# Patient Record
Sex: MALE | Race: WHITE | Employment: FULL TIME | URBAN - METROPOLITAN AREA
[De-identification: names, ages, dates, MRNs, and addresses within clinical notes are randomized per-mention and may not be internally consistent; named-entity substitution may affect disease eponyms.]

---

## 2017-09-05 ENCOUNTER — APPOINTMENT (OUTPATIENT)
Dept: GENERAL RADIOLOGY | Age: 32
End: 2017-09-05
Attending: EMERGENCY MEDICINE
Payer: COMMERCIAL

## 2017-09-05 ENCOUNTER — HOSPITAL ENCOUNTER (EMERGENCY)
Age: 32
Discharge: HOME | End: 2017-09-05
Attending: EMERGENCY MEDICINE | Admitting: EMERGENCY MEDICINE
Payer: COMMERCIAL

## 2017-09-05 VITALS
RESPIRATION RATE: 16 BRPM | SYSTOLIC BLOOD PRESSURE: 149 MMHG | HEART RATE: 89 BPM | DIASTOLIC BLOOD PRESSURE: 79 MMHG | TEMPERATURE: 98.3 F | OXYGEN SATURATION: 100 %

## 2017-09-05 DIAGNOSIS — S43.402A SPRAIN OF LEFT SHOULDER, UNSPECIFIED SHOULDER SPRAIN TYPE, INITIAL ENCOUNTER: ICD-10-CM
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
## 2017-09-05 DIAGNOSIS — R03.0 ELEVATED BLOOD PRESSURE READING: ICD-10-CM

## 2017-09-05 DIAGNOSIS — S30.1XXA CONTUSION OF ABDOMINAL WALL, INITIAL ENCOUNTER: ICD-10-CM

## 2017-09-05 LAB
APPEARANCE UR: CLEAR
BILIRUB UR QL: NEGATIVE
COLOR UR: YELLOW
GLUCOSE UR STRIP.AUTO-MCNC: NEGATIVE MG/DL
HGB UR QL STRIP: NEGATIVE
KETONES UR QL STRIP.AUTO: NEGATIVE MG/DL
LEUKOCYTE ESTERASE UR QL STRIP.AUTO: NEGATIVE
NITRITE UR QL STRIP.AUTO: NEGATIVE
PH UR STRIP: 6 [PH] (ref 5–8)
PROT UR STRIP-MCNC: NEGATIVE MG/DL
SP GR UR REFRACTOMETRY: 1.01 (ref 1–1.03)
UROBILINOGEN UR QL STRIP.AUTO: 0.2 EU/DL (ref 0.2–1)

## 2017-09-05 PROCEDURE — 73030 X-RAY EXAM OF SHOULDER: CPT

## 2017-09-05 PROCEDURE — 99282 EMERGENCY DEPT VISIT SF MDM: CPT

## 2017-09-05 PROCEDURE — 81003 URINALYSIS AUTO W/O SCOPE: CPT | Performed by: EMERGENCY MEDICINE

## 2017-09-05 PROCEDURE — 73000 X-RAY EXAM OF COLLAR BONE: CPT

## 2017-09-05 RX ORDER — CYCLOBENZAPRINE HCL 10 MG
10 TABLET ORAL
Qty: 10 TAB | Refills: 0 | Status: SHIPPED | OUTPATIENT
Start: 2017-09-05 | End: 2020-01-21 | Stop reason: SDUPTHER

## 2017-09-05 RX ORDER — BUTALBITAL, ACETAMINOPHEN AND CAFFEINE 50; 325; 40 MG/1; MG/1; MG/1
1 TABLET ORAL
Status: DISCONTINUED | OUTPATIENT
Start: 2017-09-05 | End: 2017-09-05 | Stop reason: HOSPADM

## 2017-09-05 RX ORDER — IBUPROFEN 600 MG/1
600 TABLET ORAL
Status: DISCONTINUED | OUTPATIENT
Start: 2017-09-05 | End: 2017-09-05 | Stop reason: HOSPADM
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
## 2020-01-21 ENCOUNTER — HOSPITAL ENCOUNTER (OUTPATIENT)
Dept: OCCUPATIONAL MEDICINE | Age: 35
Discharge: HOME | End: 2020-01-21
Attending: FAMILY MEDICINE

## 2020-01-21 ENCOUNTER — OFFICE VISIT (OUTPATIENT)
Dept: ORTHOPEDIC SURGERY | Age: 35
End: 2020-01-21

## 2020-01-21 VITALS
HEIGHT: 74 IN | DIASTOLIC BLOOD PRESSURE: 96 MMHG | WEIGHT: 239 LBS | SYSTOLIC BLOOD PRESSURE: 132 MMHG | TEMPERATURE: 97.3 F | BODY MASS INDEX: 30.67 KG/M2 | RESPIRATION RATE: 15 BRPM | HEART RATE: 62 BPM

## 2020-01-21 DIAGNOSIS — S39.012A LUMBOSACRAL STRAIN, INITIAL ENCOUNTER: ICD-10-CM

## 2020-01-21 DIAGNOSIS — S39.012A LUMBOSACRAL STRAIN, INITIAL ENCOUNTER: Primary | ICD-10-CM

## 2020-01-21 DIAGNOSIS — M99.05 PELVIC SOMATIC DYSFUNCTION: ICD-10-CM

## 2020-01-21 DIAGNOSIS — M99.03 LUMBAR REGION SOMATIC DYSFUNCTION: ICD-10-CM

## 2020-01-21 DIAGNOSIS — M99.04 SACRAL REGION SOMATIC DYSFUNCTION: ICD-10-CM

## 2020-01-21 RX ORDER — CYCLOBENZAPRINE HCL 10 MG
10 TABLET ORAL
Qty: 10 TAB | Refills: 0 | Status: SHIPPED | OUTPATIENT
Start: 2020-01-21 | End: 2020-09-23

## 2020-01-21 RX ORDER — DIAZEPAM 2 MG/1
5 TABLET ORAL
Qty: 30 TAB | Refills: 0 | Status: SHIPPED | OUTPATIENT
Start: 2020-01-21 | End: 2020-09-23

## 2020-01-21 RX ORDER — PREDNISONE 20 MG/1
TABLET ORAL
Qty: 28 TAB | Refills: 0 | Status: SHIPPED | OUTPATIENT
Start: 2020-01-21 | End: 2020-03-05 | Stop reason: ALTCHOICE

## 2020-01-21 RX ORDER — HYDROCODONE BITARTRATE AND ACETAMINOPHEN 5; 325 MG/1; MG/1
1 TABLET ORAL
Qty: 21 TAB | Refills: 0 | Status: SHIPPED | OUTPATIENT
Start: 2020-01-21 | End: 2020-01-28

## 2020-01-21 NOTE — PATIENT INSTRUCTIONS
Follow-up on referral to physical therapy, perform stretches 2 - 5 times daily, use medication as prescribed, and return to the office in about 6 weeks. Search YouTube for my channel: 
 
Dr. Bello Ip Low back/Piriformis

## 2020-01-21 NOTE — PROGRESS NOTES
HISTORY OF PRESENT ILLNESS    John Martinez is a 27y.o. year old male comes in today as new patient for: low back pain    Patients symptoms have been present for about a month. Pain level 7/10 lower. It is unchanged with more activity which has helped in prior episodes. Patient has tried:  TENS unit which helps and ice without benefit. Using ibuprofen which helps a lot but stomach issues. It is described as pain and stiff w/o known injury and started after waking. 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 IMAGING: XR lumbar today narrowing L5S1 disc and straightening lumbar lordosis per my review - discussed w/ Pt. Past Surgical History:   Procedure Laterality Date    HX ACL RECONSTRUCTION      20 yrs ago cant remember what side per pt statement      Social History     Socioeconomic History    Marital status: SINGLE     Spouse name: Not on file    Number of children: Not on file    Years of education: Not on file    Highest education level: Not on file   Tobacco Use    Smoking status: Never Smoker    Smokeless tobacco: Never Used   Substance and Sexual Activity    Alcohol use: Yes     Alcohol/week: 1.0 standard drinks     Types: 1 Cans of beer per week     Comment: occasionally    Drug use: No   Social History Narrative    ** Merged History Encounter **           Current Outpatient Medications   Medication Sig Dispense Refill    XOLAIR 150 mg solr       omeprazole (PRILOSEC) 20 mg capsule TAKE 1 CAPSULE TWICE A DAY---30 MINS BEFOER BREAKFAST AD N DINNER ORALLY 90 DAYS  3    cyclobenzaprine (FLEXERIL) 10 mg tablet Take 1 Tab by mouth three (3) times daily as needed for Muscle Spasm(s). 10 Tab 0    omalizumab (XOLAIR) 150 mg solr 300 mg.  butalbital-acetaminophen-caff (FIORICET) -40 mg per capsule TAKE 1 TAB BY MOUTH 3 TIMES DAILY AS NEEDED.   3    rosuvastatin (CRESTOR) 10 mg tablet TAKE 1 TAB BY MOUTH EVERY NIGHT AT BEDTIME.  0     Past Medical History:   Diagnosis Date    Decreased urine stream     Dyslipidemia     Erythema multiforme     Heartburn     High cholesterol     Nocturia     Urinary frequency     Urinary urgency     Weak urinary stream      Family History   Problem Relation Age of Onset    No Known Problems Mother     No Known Problems Father     Other Brother        ROS:  No numb, tingle, swell, bruise, incont, fever. All other systems reviewed and negative aside from that written in the HPI. Objective:  BP (!) 132/96   Pulse 62   Temp 97.3 °F (36.3 °C)   Resp 15   Ht 6' 2\" (1.88 m)   Wt 239 lb (108.4 kg)   BMI 30.69 kg/m²   GEN:  Appears stated age in NAD. NEURO:  Sensation intact to light touch. Reflexes +2/4 patellar and Achilles bilaterally. M/S:  Examined seated and supine. Slump negative. Standing flexion test positive left  Stork positive left. ASIS low left  Iliac crests equal bilaterally Pubes equal bilaterally Medial malleolus low left  Sacral base posterior left  TRISH low left  Sphinx test Positive left TTA at L4, 5 on right worse flexion Rib(s) no TTP and equal LE Strength +5/5 bilaterally Piriformis normal bilaterally  EXT:  no clubbing/cyanosis. no edema. SKIN: Warm & dry w/o rash. HEENT: Conjunctiva/lids WNL. External canals/nares WNL. Tongue midline. PERRL, EOMI. Hearing intact. NECK: Trachea midline. Supple, Full ROM. No thyromegaly. CARDIAC: No edema. LUNGS: Normal effort. ABD: Soft, no masses. No HSM. PSYCH: A+O x3. Appropriate judgment and insight. Assessment/Plan:     ICD-10-CM ICD-9-CM    1. Lumbosacral strain, initial encounter S39.012A 846.0 XR SPINE LUMB 2 OR 3 V      predniSONE (DELTASONE) 20 mg tablet      cyclobenzaprine (FLEXERIL) 10 mg tablet      REFERRAL TO PHYSICAL THERAPY      diazePAM (VALIUM) 2 mg tablet      HYDROcodone-acetaminophen (NORCO) 5-325 mg per tablet   2.  Lumbar region somatic dysfunction M99.03 739.3 RI OSTEOPATHIC MANIP,3-4 BODY REGN   3. Sacral region somatic dysfunction M99.04 739.4 RI OSTEOPATHIC MANIP,3-4 BODY REGN   4. Pelvic somatic dysfunction M99.05 739.5 RI OSTEOPATHIC MANIP,3-4 BODY REGN     Verbal consent obtained. Lumbar, Pelvic and Sacral SD treated with ME. Correction of previous malalignments verified after Tx. Pt tolerated well. Notes improvement of Sx and pain is now rated 6-7/10 but stiffened significantly within 10 minutes. HEP/stretches daily. Discussed stretching/strengthening/posture. Patient (or guardian if minor) verbalizes understanding of evaluation and plan. Will start PT and use prednisone and flexeril for spasm at bed and demo HEP plan start PT when returns from business 2 weeks and RTC 6 weeks. Norco and valium for severe. Discussed periodic stretch during flight to [facility] and notify if issues.

## 2020-01-21 NOTE — PROGRESS NOTES
AVS reviewed: YES  HEP: AT demonstrated  Resources Provided: YES YouTube Videos  Patient questions/concerns answered: YES. Pt experienced increase pain while walking to x-ray. Pt's concerns were addressed by JMM & Pt encouraged to stretch.   Patient verbalized understanding of treatment plan: YES

## 2020-02-04 ENCOUNTER — OFFICE VISIT (OUTPATIENT)
Dept: ORTHOPEDIC SURGERY | Age: 35
End: 2020-02-04

## 2020-02-04 VITALS
SYSTOLIC BLOOD PRESSURE: 136 MMHG | TEMPERATURE: 97.1 F | RESPIRATION RATE: 15 BRPM | BODY MASS INDEX: 31.44 KG/M2 | HEART RATE: 81 BPM | HEIGHT: 74 IN | DIASTOLIC BLOOD PRESSURE: 94 MMHG | WEIGHT: 245 LBS

## 2020-02-04 DIAGNOSIS — M99.04 SACRAL REGION SOMATIC DYSFUNCTION: ICD-10-CM

## 2020-02-04 DIAGNOSIS — S39.012D LUMBOSACRAL STRAIN, SUBSEQUENT ENCOUNTER: Primary | ICD-10-CM

## 2020-02-04 DIAGNOSIS — M99.03 LUMBAR REGION SOMATIC DYSFUNCTION: ICD-10-CM

## 2020-02-04 DIAGNOSIS — M99.05 PELVIC SOMATIC DYSFUNCTION: ICD-10-CM

## 2020-02-04 RX ORDER — MELOXICAM 15 MG/1
TABLET ORAL
Qty: 90 TAB | Refills: 0 | Status: SHIPPED | OUTPATIENT
Start: 2020-02-04 | End: 2020-09-23 | Stop reason: ALTCHOICE

## 2020-02-04 NOTE — PATIENT INSTRUCTIONS
Follow-up with physical therapy to get scheduled, perform stretches daily, use medication as prescribed, and return to the office in about 4 weeks. Search YouTube for my channel: 
 
Dr. Adalberto Joyner Low back/[place]aira

## 2020-02-04 NOTE — PROGRESS NOTES
HISTORY OF PRESENT ILLNESS    Teri Dunlap is a 27y.o. year old male comes in today to be evaluated and treated for: back pain    Since last appt has noticed great improvement after manipulation the next day but traveled to [facility] foir work and 2 days later felt pop and pain returned. Has not yet schedule PT. Pain level 7/10. Using TENS, ice, prednisone taper, norco, felexeril, valium with benefit. IMAGING: XR lumbar 1/21/2020  IMPRESSION: Grade 1 retrolisthesis L5 with potential L5 spondylolysis. DDD at L5-S1. Past Surgical History:   Procedure Laterality Date    HX ACL RECONSTRUCTION      20 yrs ago cant remember what side per pt statement      Social History     Socioeconomic History    Marital status: SINGLE     Spouse name: Not on file    Number of children: Not on file    Years of education: Not on file    Highest education level: Not on file   Tobacco Use    Smoking status: Never Smoker    Smokeless tobacco: Never Used   Substance and Sexual Activity    Alcohol use: Yes     Alcohol/week: 1.0 standard drinks     Types: 1 Cans of beer per week     Comment: occasionally    Drug use: No   Social History Narrative    ** Merged History Encounter **          Current Outpatient Medications   Medication Sig Dispense Refill    omalizumab (XOLAIR) 150 mg solr 300 mg.  predniSONE (DELTASONE) 20 mg tablet Take 2 tabs in AM with food for 7 days then 1 tab in AM with food until gone 28 Tab 0    cyclobenzaprine (FLEXERIL) 10 mg tablet Take 1 Tab by mouth three (3) times daily as needed for Muscle Spasm(s). 10 Tab 0    diazePAM (VALIUM) 2 mg tablet Take 2.5 Tabs by mouth every eight (8) hours as needed (spasm). Max Daily Amount: 15 mg. 30 Tab 0    butalbital-acetaminophen-caff (FIORICET) -40 mg per capsule TAKE 1 TAB BY MOUTH 3 TIMES DAILY AS NEEDED.   3    XOLAIR 150 mg solr       omeprazole (PRILOSEC) 20 mg capsule TAKE 1 CAPSULE TWICE A DAY---30 MINS BEFOER BREAKFAST AD N DINNER ORALLY 90 DAYS  3  rosuvastatin (CRESTOR) 10 mg tablet TAKE 1 TAB BY MOUTH EVERY NIGHT AT BEDTIME.  0     Past Medical History:   Diagnosis Date    Decreased urine stream     Dyslipidemia     Erythema multiforme     Heartburn     High cholesterol     Nocturia     Urinary frequency     Urinary urgency     Weak urinary stream      Family History   Problem Relation Age of Onset    No Known Problems Mother     No Known Problems Father     Other Brother      ROS:  No numb, tingle, swell, bruise, incont, fever. Objective:  BP (!) 136/94   Pulse 81   Temp 97.1 °F (36.2 °C)   Resp 15   Ht 6' 2\" (1.88 m)   Wt 245 lb (111.1 kg)   BMI 31.46 kg/m²   GEN:  Appears stated age in NAD. NEURO:  Sensation intact to light touch. Reflexes +2/4 patellar and Achilles bilaterally. M/S:  Examined seated and supine. Slump negative. Standing flexion test positive left  Stork positive left. ASIS low left  Iliac crests equal bilaterally Pubes equal bilaterally Medial malleolus low left  Sacral base posterior left  TRISH low left  Sphinx test Positive left TTA at L3, 4, 5 on right worse flexion Rib(s) no TTP and equal LE Strength +5/5 bilaterally Piriformis normal bilaterally  EXT:  no clubbing/cyanosis. no edema. SKIN: Warm & dry w/o rash. Assessment/Plan:     ICD-10-CM ICD-9-CM    1. Lumbosacral strain, subsequent encounter S39.012D V58.89 meloxicam (MOBIC) 15 mg tablet     846.0    2. Pelvic somatic dysfunction M99.05 739.5 TX OSTEOPATHIC MANIP,3-4 BODY REGN   3. Sacral region somatic dysfunction M99.04 739.4 TX OSTEOPATHIC MANIP,3-4 BODY REGN   4. Lumbar region somatic dysfunction M99.03 739.3 TX OSTEOPATHIC MANIP,3-4 BODY REGN       Patient (or guardian if minor) verbalizes understanding of evaluation and plan. Verbal consent obtained. Lumbar, Pelvic and Sacral SD treated with ME. Correction of previous malalignments verified after Tx. Pt tolerated well.   Notes improvement of Sx and pain is now rated 1-2/10. HEP/stretches daily. Discussed stretching and will start PT and finish prednisone taper w/ mobic PRN and RTC 4 weeks.

## 2020-02-13 ENCOUNTER — APPOINTMENT (OUTPATIENT)
Dept: PHYSICAL THERAPY | Age: 35
End: 2020-02-13

## 2020-02-19 ENCOUNTER — HOSPITAL ENCOUNTER (OUTPATIENT)
Dept: PHYSICAL THERAPY | Age: 35
Discharge: HOME | End: 2020-02-19
Payer: COMMERCIAL

## 2020-02-19 PROCEDURE — 97162 PT EVAL MOD COMPLEX 30 MIN: CPT

## 2020-02-19 PROCEDURE — 97110 THERAPEUTIC EXERCISES: CPT

## 2020-02-19 NOTE — PROGRESS NOTES
[address] [place] [address] PHYSICAL THERAPY AT [facility] [address], [address] [facility] Elsy Wilk[address] - Phone: [phone]  Fax: [phone] / 0594 [facility]  Patient Name: Real Ray : 1985   Medical   Diagnosis: Back pain [M54.9]  Strain of muscle, fascia and tendon of lower back, initial encounter [X53.343L] Treatment Diagnosis: Back pain [M54.9]   Onset Date: 2019     Referral Source: Roberto Saeed [facility]): 2020   Prior Hospitalization: See medical history Provider #: [id number]   Prior Level of Function: WNL without limitations, intermittent \"throwing his back out\" that would resolve within 1 week   Comorbidities: Asthma   Medications: Verified on Patient Summary List   The Plan of Care and following information is based on the information from the initial evaluation.   ==================================================================================  Assessment / key information: Patient is 27 y.o. yo male who presents to [facility] with diagnosis of Back pain [M54.9]  Strain of muscle, fascia and tendon of lower back, initial encounter [D26.990E]. Pt reports onset LBP 2019. Pt has previous history of LB pain and spasms, which usually resolve in a week. Having an adjustment helps so he has gone to a chiropractor, which usually helps with his symptoms. Objective data detailed below. Patient scored 48 on FOTO indicating decreased function and quality of life. Functional limitations include difficulty with first thing in the morning, bending over, dragging luggage through airport, walking >1 block, sit-stand transfers from lower surfaces, stairs negotiation, standing. Pt would benefit from skilled PT services to address impairments, work towards goals, and return to OF.     Pain: current 0/10, at worst 8/10, at best 0/10  Aggravating factors: first thing in the morning, bending over, dragging luggage through airport, walking >1 block, sit-stand transfers from lower surfaces, stairs negotiation, standing  Alleviating factors: sitting, Ibuprofen  Pain description: intense dull into very sharp  Pain location: center or low back into lateral low back  Radiation? Numbness/tingling?  Not that pt can recall    Lumbar AROM/pain: flex mid shin with discomfort return to neutral, ext guarded without pain, SB R to lat R knee no pain L to lat L knee no pain, rot WNL bilat no pain  Hip MMT: flex R 4+ L 4+, ext R 3 L 3, ABD R 4 L 4, ADD R 3- L 3-, IR R 4+ L 4+, ER R 4+ L 4+  Knee/Ankle MMT: grossly WNL bilat  Upper abdom MMT: 4 (able to perform sit up with arms across chest)  Lower abdom MMT: 3+ (able to lower about 30 degs from vertical keeping lower abdom activated)  Sit-to-stand: dec forw flex phase  Ambulation: dec lumbar rotation  Stairs negotiation: unremarkable  Palpation: TTP and inc tone bilat lumbar paraspinals, bilat PSIS    Treatment performed: MHP to low back s/p session in supine with BLE elevated x10 mins  Patient response to treatment: Pt reports slight inc in low back tension at end of session, attributes it to AROM/MMT assessment.  ==================================================================================  Eval Complexity: History: MEDIUM  Complexity : 1-2 comorbidities / personal factors will impact the outcome/ POC Exam:MEDIUM Complexity : 3 Standardized tests and measures addressing body structure, function, activity limitation and / or participation in recreation  Presentation: MEDIUM Complexity : Evolving with changing characteristics  Clinical Decision Making:MEDIUM Complexity : FOTO score of 26-74Overall Complexity:MEDIUM  Problem List: pain affecting function, decrease ROM, decrease strength, impaired gait/ balance, decrease ADL/ functional abilitiies, decrease activity tolerance, decrease flexibility/ joint mobility and decrease transfer abilities   Treatment Plan may include any combination of the following: Therapeutic exercise, Therapeutic activities, Neuromuscular re-education, Physical agent/modality, Gait/balance training, Manual therapy, Patient education, Self Care training, Functional mobility training and Stair training  Patient / Family readiness to learn indicated by: asking questions, trying to perform skills and interest  Persons(s) to be included in education: patient (P)  Barriers to Learning/Limitations: None  Measures taken:    Patient Goal (s): \"To fix my back\"   Patient self reported health status: fair  Rehabilitation Potential: good   Short Term Goals: To be accomplished in 4 weeks:  1) Patient performing daily HEP. 2) Patient will report 50% improvement in ability to perform LE functional tasks, ADL, work duties. 3) Patient to demo lumbar AROM FF to toes without pain to facilitate LE functional tasks, ADL, work duties.  Long Term Goals: To be accomplished in 6 weeks:  1) Patient Independent with progressive HEP. 2) Patient to demo hip ABD, ER/IR, extension MMT 5/5 bilat to facilitate LE functional tasks, ADL, work duties. 3) Patient to demo lower abdominal MMT 5/5 (performs sit up with hands behind head) to facilitate LE functional tasks, ADL, work duties. 4) Increase FOTO score to 71 indicating improved function and quality of life. Frequency / Duration:   Patient to be seen 2-3 times per week for 6 weeks:  Patient / Caregiver education and instruction: exercises    Therapist Signature: Moody Maria PT Date: 1/93/7581   Certification Period: na Time: 10:51 AM   ==================================================================================  I certify that the above Physical Therapy Services are being furnished while the patient is under my care. I agree with the treatment plan and certify that this therapy is necessary.     Physician Signature:        Date:       Time:     Please sign and return to [facility] or you may fax the signed copy to [phone]. Thank you.

## 2020-02-19 NOTE — PROGRESS NOTES
PHYSICAL THERAPY - DAILY TREATMENT NOTE    Patient Name: Elizabeth Caballero        Date: 2020  : 1985   YES Patient  Verified  Visit #:      18  Insurance: Payor: BLUE CROSS / [facility] / Product Type: PPO /      In time: 10:45 AM Out time: 11:45 AM   Total Treatment Time: 60     Medicare Time /BCBS Tracking (below)   Total Timed Codes (min):  na 1:1 Treatment Time:  na     TREATMENT AREA =  Back pain [M54.9]  Strain of muscle, fascia and tendon of lower back, initial encounter [S39.012A]    SUBJECTIVE  Pain Level (on 0 to 10 scale):  0  / 10   Medication Changes/New allergies or changes in medical history, any new surgeries or procedures? NO    If yes, update Summary List   Subjective Functional Status/Changes:  []  No changes reported     See POC       OBJECTIVE   Modalities Rationale:     decrease pain and increase tissue extensibility to improve patient's ability to perform LE functional tasks and ADL   min [] Estim, type/location:                                      []  att     []  unatt     []  w/US     []  w/ice    []  w/heat    min []  Mechanical Traction: type/lbs                   []  pro   []  sup   []  int   []  cont    []  before manual    []  after manual    min []  Ultrasound, settings/location:      min []  Iontophoresis w/ dexamethasone, location:                                               []  take home patch       []  in clinic   10 min []  Ice     [x]  Heat    location/position:  To low back in supine s/p eval    min []  Vasopneumatic Device, press/temp:     min []  Other:    [x] Skin assessment post-treatment (if applicable):    [x]  intact    []  redness- no adverse reaction     []redness  adverse reaction:        25 min Therapeutic Exercise:  [x]  See flow sheet   Rationale:      increase ROM and increase strength to improve the patients ability to perform LE functional tasks and ADL    With TE min Patient Education:  YES  Reviewed HEP, diagnosis, prognosis, POC   []  Progressed/Changed HEP based on: Other Objective/Functional Measures:    See POC     Post Treatment Pain Level (on 0 to 10) scale:   0.5  / 10     ASSESSMENT  Assessment/Changes in Function:     Justification for Eval Code Complexity:  Patient History: WNL without limitations, intermittent \"throwing his back out\" that would resolve within 1 week  Examination: See exam  Clinical Presentation: evolving  Clinical Decision Making: MEDIUM  FOTO: 50 /100     []  See Progress Note/Recertification   Patient will continue to benefit from skilled PT services to modify and progress therapeutic interventions, address functional mobility deficits, address ROM deficits, address strength deficits, analyze and address soft tissue restrictions, analyze and cue movement patterns, analyze and modify body mechanics/ergonomics and assess and modify postural abnormalities to attain remaining goals.    Progress toward goals / Updated goals:    See POC     PLAN  [x]  Upgrade activities as tolerated YES Continue plan of care   []  Discharge due to :    []  Other:      Therapist: Antonio Devries PT    Date: 2/19/2020 Time: 10:51 AM     Future Appointments   Date Time Provider Yamil Dominguez   3/5/2020  8:00 AM [address]   3/6/2020  9:20 AM [address]

## 2020-02-20 ENCOUNTER — HOSPITAL ENCOUNTER (OUTPATIENT)
Dept: PHYSICAL THERAPY | Age: 35
Discharge: HOME | End: 2020-02-20
Payer: COMMERCIAL

## 2020-02-20 PROCEDURE — 97110 THERAPEUTIC EXERCISES: CPT

## 2020-02-20 NOTE — PROGRESS NOTES
PHYSICAL THERAPY - DAILY TREATMENT NOTE    Patient Name: Lashell Brandt        Date: 2020  : 1985   YES Patient  Verified  Visit #:   2 of   18  Insurance: Payor: BLUE CROSS / Plan: [facility] / Product Type: PPO /      In time: 11:45 AM Out time: 12:40 pm    Total Treatment Time: 55     Medicare Time /BCBS Tracking (below)   Total Timed Codes (min):  na 1:1 Treatment Time:  na     TREATMENT AREA =  Back pain [M54.9]  Strain of muscle, fascia and tendon of lower back, initial encounter [S39.012A]    SUBJECTIVE  Pain Level (on 0 to 10 scale):  1  / 10- low back   Medication Changes/New allergies or changes in medical history, any new surgeries or procedures? NO    If yes, update Summary List   Subjective Functional Status/Changes:  []  No changes reported     Pt reports doing HEP. OBJECTIVE   Modalities Rationale:     decrease pain and increase tissue extensibility to improve patient's ability to perform LE functional tasks and ADL   min [] Estim, type/location:                                      []  att     []  unatt     []  w/US     []  w/ice    []  w/heat    min []  Mechanical Traction: type/lbs                   []  pro   []  sup   []  int   []  cont    []  before manual    []  after manual    min []  Ultrasound, settings/location:      min []  Iontophoresis w/ dexamethasone, location:                                               []  take home patch       []  in clinic   10 min []  Ice     [x]  Heat    location/position:  To low back in supine s/p eval    min []  Vasopneumatic Device, press/temp:     min []  Other:    [x] Skin assessment post-treatment (if applicable):    [x]  intact    []  redness- no adverse reaction     []redness  adverse reaction:        33 min Therapeutic Exercise:  [x]  See flow sheet   Rationale:      increase ROM and increase strength to improve the patients ability to perform LE functional tasks and ADL    12 min Patient Education:  Bladimir Coffee Reviewed HEP, pt education in proper positioning, supported and unsupported sitting, bending, reaching, golfers bend body mechanics. []  Progressed/Changed HEP based on: Other Objective/Functional Measures:    Review initial HEP. Add exercise per flow sheet     Post Treatment Pain Level (on 0 to 10) scale:   0 / 10-LB, 2/10 upper back     ASSESSMENT  Assessment/Changes in Function:   Verbal cues throughout treatment for proper exercise technique. Reviewed initial HEP. Pt demonstrating good technique with VCs. Pt given additional copy of HEP per pt request.        []  See Progress Note/Recertification   Patient will continue to benefit from skilled PT services to modify and progress therapeutic interventions, address functional mobility deficits, address ROM deficits, address strength deficits, analyze and address soft tissue restrictions, analyze and cue movement patterns, analyze and modify body mechanics/ergonomics and assess and modify postural abnormalities to attain remaining goals. Progress toward goals / Updated goals:    First visit from initial evaluation. Progressed treatment per plan of care.      PLAN  [x]  Upgrade activities as tolerated YES Continue plan of care   []  Discharge due to :    []  Other:      Therapist: Minal Hernadez PTA    Date: 2/20/2020 Time: 12:40 pm     Future Appointments   Date Time Provider Yamil Dominguez   2/24/2020  2:30 PM Herlinda Grams, PT [facility]   2/26/2020  3:00 PM Kenny Akhtar, PTA [facility]   2/28/2020  3:30 PM Herlinda Grams, PT [facility]   3/2/2020  2:30 PM Kenny Akhtar, PTA [facility]   3/4/2020 12:00 PM Herlinda Grams, PT [facility]   3/5/2020  8:00 [facility]   3/6/2020  9:20 AM DO PARIS Moss [facility]   3/6/2020  2:30 PM Kenny Akhtar, PTA [facility]   3/9/2020  2:30 PM Kenny Akhtar, PTA [facility]   3/11/2020  3:00 PM Kenny Akhtar, PTA [facility]   3/13/2020  1:30 PM Tim Ralph, Una Mackay [facility]   3/16/2020  2:30 PM Aleksander Rice, PTA [facility]   3/18/2020  3:00 PM Vimal Pean, PT [facility]   3/20/2020  2:30 PM Loriy Pean, PT [facility]   3/23/2020  3:30 PM Loriy Pean, PT [facility]   3/25/2020  3:30 PM Erby Pean, PT [facility]   3/27/2020  3:30 PM Loriy Pean, PT [facility]   3/30/2020  3:30 PM Aleksander Rice, PTA [facility]   4/1/2020  3:30 PM Loriy Pean, PT [facility]   4/3/2020  3:00 PM Loriy Pean, PT [facility]

## 2020-02-24 ENCOUNTER — HOSPITAL ENCOUNTER (OUTPATIENT)
Dept: PHYSICAL THERAPY | Age: 35
Discharge: HOME | End: 2020-02-24
Payer: COMMERCIAL

## 2020-02-24 PROCEDURE — 97110 THERAPEUTIC EXERCISES: CPT

## 2020-02-24 NOTE — PROGRESS NOTES
PHYSICAL THERAPY - DAILY TREATMENT NOTE    Patient Name: Alee See        Date: 2020  : 1985   YES Patient  Verified  Visit #:   3   of   18  Insurance: Payor: BLUE CROSS / [facility] / Product Type: PPO /      In time: 2:33 PM Out time: 3:03 PM   Total Treatment Time: 30     Medicare Time /BCBS Tracking (below)   Total Timed Codes (min):  30 1:1 Treatment Time:  30     TREATMENT AREA =  Back pain [M54.9]  Strain of muscle, fascia and tendon of lower back, initial encounter [S39.012A]    SUBJECTIVE  Pain Level (on 0 to 10 scale):  1-2  / 10   Medication Changes/New allergies or changes in medical history, any new surgeries or procedures? NO    If yes, update Summary List   Subjective Functional Status/Changes:  []  No changes reported     Pt reports same amt of pain this date, would like to defer [address] today. OBJECTIVE    30 min Therapeutic Exercise:  [x]  See flow sheet   Rationale:      increase ROM and increase strength to improve the patients ability to perform LE functional tasks and ADL    With TE min Patient Education:  YES  Reviewed HEP   []  Progressed/Changed HEP based on: Other Objective/Functional Measures: Added pilates 100 this date. Post Treatment Pain Level (on 0 to 10) scale:   1-2  / 10     ASSESSMENT  Assessment/Changes in Function:     Pt reports that he would like to be more challenge nv, exercises progressed per flow sheet. Demo provided for proper alignment during pilates 100 exercise. []  See Progress Note/Recertification   Patient will continue to benefit from skilled PT services to modify and progress therapeutic interventions, address functional mobility deficits, address ROM deficits, address strength deficits, analyze and address soft tissue restrictions, analyze and cue movement patterns, analyze and modify body mechanics/ergonomics and assess and modify postural abnormalities to attain remaining goals.    Progress toward goals / Updated goals:    · Goals: To be accomplished in 4 weeks:  1) Patient performing daily HEP. 2) Patient will report 50% improvement in ability to perform LE functional tasks, ADL, work duties. 3) Patient to demo lumbar AROM FF to toes without pain to facilitate LE functional tasks, ADL, work duties.        PLAN  [x]  Upgrade activities as tolerated YES Continue plan of care   []  Discharge due to :    []  Other:      Therapist: Alfredo Gautam PT    Date: 2/24/2020 Time: 3:28 PM     Future Appointments   Date Time Provider Yamil Dominguez   2/26/2020  3:00 PM Carrolfrancine Valentine, [facility]   2/28/2020  3:30 PM Drew Sever, PT [facility]   3/2/2020  2:30 PM Carrol Meme, [facility]   3/4/2020 12:00 PM Drew Sever, PT [facility]   3/5/2020  8:00 AM [facility]   3/6/2020  9:20 AM DO PARIS [facility]   3/6/2020  2:30 PM Carrol Meme, [facility]   3/9/2020  2:30 PM Carrol Meme, [facility]   3/11/2020  3:00 PM Carrol Meme, [facility]   3/13/2020  1:30 PM Carrol Meme, [facility]   3/16/2020  2:30 PM Carrol Meme, [facility]   3/18/2020  3:00 PM Drew Sever, PT [facility]   3/20/2020  2:30 PM Drew Sever, PT [facility]   3/23/2020  3:30 PM Drew Sever, PT [facility]   3/25/2020  3:30 PM Drew Sever, PT [facility]   3/27/2020  3:30 PM Drew Sever, PT [facility]   3/30/2020  3:30 PM Carrol Valentine, PTA [facility]   4/1/2020  3:30 PM Drew Sever, PT [facility]   4/3/2020  3:00 PM Drew Sever, PT [facility]

## 2020-02-26 ENCOUNTER — HOSPITAL ENCOUNTER (OUTPATIENT)
Dept: PHYSICAL THERAPY | Age: 35
Discharge: HOME | End: 2020-02-26
Payer: COMMERCIAL

## 2020-02-26 PROCEDURE — 97110 THERAPEUTIC EXERCISES: CPT

## 2020-02-26 NOTE — PROGRESS NOTES
PHYSICAL THERAPY - DAILY TREATMENT NOTE    Patient Name: Real Ray        Date: 2020  : 1985   YES Patient  Verified  Visit #:      18  Insurance: Payor: BLUE CROSS / Plan: [employer] [facility] / Product Type: PPO /      In time: 3;04  Out time: 3:47 PM   Total Treatment Time: 43     Medicare Time /BCBS Tracking (below)   Total Timed Codes (min):  33 1:1 Treatment Time:  24     TREATMENT AREA =  Back pain [M54.9]  Strain of muscle, fascia and tendon of lower back, initial encounter [S39.012A]    SUBJECTIVE  Pain Level (on 0 to 10 scale): 3   / 10- low back   Medication Changes/New allergies or changes in medical history, any new surgeries or procedures? NO    If yes, update Summary List   Subjective Functional Status/Changes:  []  No changes reported     Pt reports I woke up with just a pain in my back and this morning it was a lot worse and it hasn't gotten much better throughout the day       OBJECTIVE  OBJECTIVE   Modalities Rationale:     decrease pain and increase tissue extensibility to improve patient's ability to perform LE functional tasks and ADL                min []? Estim, type/location:                                                            []?  att     []?  unatt     []?  w/US     []?  w/ice    []?  w/heat     min []? Mechanical Traction: type/lbs                    []?  pro   []?  sup   []? int   []?  cont    []? before manual    []? after manual     min []? Ultrasound, settings/location:        min []? Iontophoresis w/ dexamethasone, location:                                                []?  take home patch       []? in clinic   10 min []? Ice     []? Heat    location/position: To low back in supine s/p eval     min []? Vasopneumatic Device, press/temp:       min []? Other:     [x]? Skin assessment post-treatment (if applicable):    [x]? intact    []? redness- no adverse reaction     []? redness  adverse reaction:        33 min Therapeutic Exercise:  [x]  See flow sheet   Rationale:      increase ROM and increase strength to improve the patients ability to perform LE functional tasks and ADL    With TE min Patient Education:  YES  Reviewed HEP   []  Progressed/Changed HEP based on: Other Objective/Functional Measures: Add prone lying over 0-2 pillows, review body mechanics, activity modification for pain management     Post Treatment Pain Level (on 0 to 10) scale:    2 / 10-low back     ASSESSMENT  Assessment/Changes in Function:     Pt able to abolish pain with prone lying over 2 pillows. Unable to fully abolish in standing after treatment. []  See Progress Note/Recertification   Patient will continue to benefit from skilled PT services to modify and progress therapeutic interventions, address functional mobility deficits, address ROM deficits, address strength deficits, analyze and address soft tissue restrictions, analyze and cue movement patterns, analyze and modify body mechanics/ergonomics and assess and modify postural abnormalities to attain remaining goals. Progress toward goals / Updated goals:    · Goals: To be accomplished in 4 weeks:  1) Patient performing daily HEP. 2) Patient will report 50% improvement in ability to perform LE functional tasks, ADL, work duties. 3) Patient to demo lumbar AROM FF to toes without pain to facilitate LE functional tasks, ADL, work duties.        PLAN  [x]  Upgrade activities as tolerated YES Continue plan of care   []  Discharge due to :    []  Other:      Therapist: Qasim Cardenas PTA    Date: 2/26/2020 Time: 3:47 pm     Future Appointments   Date Time Provider Yamil Dominguez   2/26/2020  3:00 PM Hunter Sharp [facility]   2/28/2020  3:30 PM Kassidy Chacko PT [facility]   3/2/2020  2:30 PM Joy Hook PTA [facility]   3/4/2020 12:00 PM Kassidy Chacko PT [facility]   3/5/2020  8:00 AM DO Chris [address]   3/6/2020  9:20 AM DO PARIS Whitaker [facility]   3/6/2020  2:30 PM Jennifer Rj, PTA [facility] [address]   3/9/2020  2:30 PM Jennifer Jersey, PTA [facility] [address]   3/11/2020  3:00 PM Jennifer Jersey, PTA [facility] [address]   3/13/2020  1:30 PM Jennifer Rj, PTA [facility] [address]   3/16/2020  2:30 PM Jennifermerlene De La Rosa, PTA [facility] [address]   3/18/2020  3:00 PM Sherie Haq, PT [facility] [address]   3/20/2020  2:30 PM Sherie Haq, PT [facility] [address]   3/23/2020  3:30 PM Sherie Haq, PT [facility] [address]   3/25/2020  3:30 PM Sherie Haq, PT [facility] [address]   3/27/2020  3:30 PM Sherie Haq, PT [facility] [address]   3/30/2020  3:30 PM Jennifer De La Rosa, PTA [facility] [address]   4/1/2020  3:30 PM Sherie Haq, PT [facility] [address]   4/3/2020  3:00 PM Sherie Haq, PT [facility] [address]

## 2020-02-28 ENCOUNTER — HOSPITAL ENCOUNTER (OUTPATIENT)
Dept: PHYSICAL THERAPY | Age: 35
Discharge: HOME | End: 2020-02-28
Payer: COMMERCIAL

## 2020-02-28 PROCEDURE — 97110 THERAPEUTIC EXERCISES: CPT

## 2020-02-28 NOTE — PROGRESS NOTES
PHYSICAL THERAPY - DAILY TREATMENT NOTE    Patient Name: Jil Speaker        Date: 2020  : 1985   YES Patient  Verified  Visit #:   5   of   18  Insurance: Payor: BLUE CROSS / Plan: [employer] [facility] / Product Type: PPO /      In time: 3:36 PM Out time: 4:15 PM   Total Treatment Time: 39     Medicare Time /BCBS Tracking (below)   Total Timed Codes (min):  39 1:1 Treatment Time:  39     TREATMENT AREA =  Back pain [M54.9]  Strain of muscle, fascia and tendon of lower back, initial encounter [S39.012A]    SUBJECTIVE  Pain Level (on 0 to 10 scale):  1  / 10   Medication Changes/New allergies or changes in medical history, any new surgeries or procedures? NO    If yes, update Summary List   Subjective Functional Status/Changes:  []  No changes reported     Pt reports continued improvement. OBJECTIVE    39 min Therapeutic Exercise:  [x]  See flow sheet   Rationale:      increase ROM and increase strength to improve the patients ability to perform functional tasks and ADL    With TE min Patient Education:  YES  Reviewed HEP   []  Progressed/Changed HEP based on: Other Objective/Functional Measures: Added planks for add'l core strengthening     Post Treatment Pain Level (on 0 to 10) scale:   0  / 10     ASSESSMENT  Assessment/Changes in Function:     Pt tolerated additions, req'd tactile cues and demo for proper alignment for planks. No adverse effects noted during treatment. []  See Progress Note/Recertification   Patient will continue to benefit from skilled PT services to modify and progress therapeutic interventions, address functional mobility deficits, address ROM deficits, address strength deficits, analyze and address soft tissue restrictions, analyze and cue movement patterns, analyze and modify body mechanics/ergonomics and assess and modify postural abnormalities to attain remaining goals. Progress toward goals / Updated goals:    · Goals:  To be accomplished in 4 weeks:  1) Patient performing daily HEP. 2) Patient will report 50% improvement in ability to perform LE functional tasks, ADL, work duties.   3) Patient to demo lumbar AROM FF to toes without pain to facilitate LE functional tasks, ADL, work duties.        PLAN  [x]  Upgrade activities as tolerated YES Continue plan of care   []  Discharge due to :    []  Other:      Therapist: Indira Grace PT    Date: 2/28/2020 Time: 5:30 PM     Future Appointments   Date Time Provider Yamil Dominguez   3/2/2020  2:30 PM Cristopher Nestle, PTA [facility]   3/4/2020 12:00 PM Aries Vivar, PT [facility]   3/5/2020  8:00 AM DO Zach Weston   3/6/2020  9:20 AM DO Abhijit Weston   3/6/2020  2:30 PM Excell Nestle, [facility]   3/9/2020  2:30 PM Excell Nestle, [facility]   3/11/2020  3:00 PM Excell Nestle, PTA [facility]   3/13/2020  1:30 PM Excell Nestle, PTA [facility]   3/16/2020  2:30 PM Excell Nestle, PTA [facility]   3/18/2020  3:00 PM Everarnela Beath, PT [facility]   3/20/2020  2:30 PM Everlina Beath, PT [facility]   3/23/2020  3:30 PM Everlina Beath, PT [facility]   3/25/2020  3:30 PM Everlina Beath, PT [facility]   3/27/2020  3:30 PM Everlina Beath, PT [facility]   3/30/2020  3:30 PM Excell Nestle, PTA [facility]   4/1/2020  3:30 PM Everlina Beath, PT [facility]   4/3/2020  3:00 PM Aries Vivar, PT [facility]

## 2020-03-02 ENCOUNTER — APPOINTMENT (OUTPATIENT)
Dept: PHYSICAL THERAPY | Age: 35
End: 2020-03-02
Payer: COMMERCIAL

## 2020-03-04 ENCOUNTER — APPOINTMENT (OUTPATIENT)
Dept: PHYSICAL THERAPY | Age: 35
End: 2020-03-04
Payer: COMMERCIAL

## 2020-03-05 ENCOUNTER — OFFICE VISIT (OUTPATIENT)
Dept: ORTHOPEDIC SURGERY | Age: 35
End: 2020-03-05

## 2020-03-05 VITALS
RESPIRATION RATE: 15 BRPM | HEIGHT: 74 IN | HEART RATE: 62 BPM | DIASTOLIC BLOOD PRESSURE: 82 MMHG | SYSTOLIC BLOOD PRESSURE: 119 MMHG | TEMPERATURE: 98 F | BODY MASS INDEX: 31.08 KG/M2 | WEIGHT: 242.2 LBS

## 2020-03-05 DIAGNOSIS — M99.04 SACRAL REGION SOMATIC DYSFUNCTION: ICD-10-CM

## 2020-03-05 DIAGNOSIS — M99.03 LUMBAR REGION SOMATIC DYSFUNCTION: ICD-10-CM

## 2020-03-05 DIAGNOSIS — M43.17 SPONDYLOLISTHESIS AT L5-S1 LEVEL: ICD-10-CM

## 2020-03-05 DIAGNOSIS — S39.012D LUMBOSACRAL STRAIN, SUBSEQUENT ENCOUNTER: Primary | ICD-10-CM

## 2020-03-05 DIAGNOSIS — M67.51 PLICA OF KNEE, RIGHT: ICD-10-CM

## 2020-03-05 DIAGNOSIS — M99.05 PELVIC SOMATIC DYSFUNCTION: ICD-10-CM

## 2020-03-05 NOTE — PATIENT INSTRUCTIONS
Continue with physical therapy until discharge and transition to home exercises. Perform knee exercises daily and return to the office as needed. Search YouTube for my channel: 
 
Dr. Adalberto Joyner Low back/Piriformis Gregg Anselena/Valeriy

## 2020-03-05 NOTE — PROGRESS NOTES
HISTORY OF PRESENT ILLNESS    Georgi Fair is a 27y.o. year old male comes in today to be evaluated and treated for: back pain    Since last appt has noticed improvement with PT and HEP. Pain level 1/10. Not using mobic as not needing it. Severe pain (10/10) after rolling in bed the other day took 15 minutes to get it straight. Stiffened over the next day. IMAGING: XR lumbar 1/21/2020  IMPRESSION: Grade 1 retrolisthesis L5 with potential L5 spondylolysis. DDD at L5-S1. Past Surgical History:   Procedure Laterality Date    HX ACL RECONSTRUCTION      20 yrs ago cant remember what side per pt statement      Social History     Socioeconomic History    Marital status: SINGLE     Spouse name: Not on file    Number of children: Not on file    Years of education: Not on file    Highest education level: Not on file   Tobacco Use    Smoking status: Never Smoker    Smokeless tobacco: Never Used   Substance and Sexual Activity    Alcohol use: Yes     Alcohol/week: 1.0 standard drinks     Types: 1 Cans of beer per week     Comment: occasionally    Drug use: No   Social History Narrative    ** Merged History Encounter **          Current Outpatient Medications   Medication Sig Dispense Refill    omalizumab (XOLAIR) 150 mg solr 300 mg.      meloxicam (MOBIC) 15 mg tablet Take 1 tab daily as needed pain with food. 90 Tab 0    cyclobenzaprine (FLEXERIL) 10 mg tablet Take 1 Tab by mouth three (3) times daily as needed for Muscle Spasm(s). 10 Tab 0    diazePAM (VALIUM) 2 mg tablet Take 2.5 Tabs by mouth every eight (8) hours as needed (spasm). Max Daily Amount: 15 mg. 30 Tab 0    butalbital-acetaminophen-caff (FIORICET) -40 mg per capsule TAKE 1 TAB BY MOUTH 3 TIMES DAILY AS NEEDED.   3    XOLAIR 150 mg solr       omeprazole (PRILOSEC) 20 mg capsule TAKE 1 CAPSULE TWICE A DAY---30 MINS BEFOER BREAKFAST AD N DINNER ORALLY 90 DAYS  3    rosuvastatin (CRESTOR) 10 mg tablet TAKE 1 TAB BY MOUTH EVERY NIGHT AT BEDTIME.  0     Past Medical History:   Diagnosis Date    Decreased urine stream     Dyslipidemia     Erythema multiforme     Heartburn     High cholesterol     Nocturia     Urinary frequency     Urinary urgency     Weak urinary stream      Family History   Problem Relation Age of Onset    No Known Problems Mother     No Known Problems Father     Other Brother        ROS:  No numb, tingle, swell, bruise, incont, fever. Objective:  /82   Pulse 62   Temp 98 °F (36.7 °C)   Resp 15   Ht 6' 2\" (1.88 m)   Wt 242 lb 3.2 oz (109.9 kg)   BMI 31.10 kg/m²   GEN:  Appears stated age in NAD. NEURO:  Sensation intact to light touch.  Reflexes +2/4 patellar and Achilles bilaterally. M/S:  Examined seated and supine.  Slump negative. Standing flexion test positive left  Stork positive left.  ASIS low left  Iliac crests equal bilaterally Pubes equal bilaterally Medial malleolus low left  Sacral base posterior left  TRISH low left  Sphinx test Positive left TTA at [facility] left worse flexion Rib(s) no TTP and equal LE Strength +5/5 bilaterally Piriformis normal bilaterally  RIGHT KNEE: Normal exam aside from mild TTP medial patella facet and significant pain plica  EXT:  no clubbing/cyanosis.  no edema. SKIN: Warm & dry w/o rash. Assessment/Plan:     ICD-10-CM ICD-9-CM    1. Lumbosacral strain, subsequent encounter S39.012D V58.89      846.0    2. Plica of knee, right G99.00 727.83    3. Spondylolisthesis at L5-S1 level M43.17 756.12    4. Pelvic somatic dysfunction M99.05 739.5 ID OSTEOPATHIC MANIP,3-4 BODY REGN   5. Sacral region somatic dysfunction M99.04 739.4 ID OSTEOPATHIC MANIP,3-4 BODY REGN   6. Lumbar region somatic dysfunction M99.03 739.3 ID OSTEOPATHIC MANIP,3-4 BODY REGN     Patient (or guardian if minor) verbalizes understanding of evaluation and plan. Will continue PT and transition to HEP on own as doing great and RTC as needed. Demo HEP for plica.     Time with Pt 29 minutes, >50% of which was counseling pt regarding Dx and Tx options and coordination of care.

## 2020-03-06 ENCOUNTER — APPOINTMENT (OUTPATIENT)
Dept: PHYSICAL THERAPY | Age: 35
End: 2020-03-06
Payer: COMMERCIAL

## 2020-03-09 ENCOUNTER — HOSPITAL ENCOUNTER (OUTPATIENT)
Dept: PHYSICAL THERAPY | Age: 35
Discharge: HOME | End: 2020-03-09
Payer: COMMERCIAL

## 2020-03-09 PROCEDURE — 97110 THERAPEUTIC EXERCISES: CPT

## 2020-03-09 NOTE — PROGRESS NOTES
PHYSICAL THERAPY - DAILY TREATMENT NOTE    Patient Name: Sandra Roque        Date: 3/9/2020  : 1985   YES Patient  Verified  Visit #:   6  of   18  Insurance: Payor: BLUE CROSS / Plan: [employer] [facility] / Product Type: PPO /      In time: 2:38 pm Out time: 3;20    Total Treatment Time: 42     Medicare Time /BCBS Tracking (below)   Total Timed Codes (min): 42 1:1 Treatment Time:  42     TREATMENT AREA =  Back pain [M54.9]  Strain of muscle, fascia and tendon of lower back, initial encounter [S39.012A]    SUBJECTIVE  Pain Level (on 0 to 10 scale):  2-3 / 10- low back   Medication Changes/New allergies or changes in medical history, any new surgeries or procedures? NO    If yes, update Summary List   Subjective Functional Status/Changes:  []  No changes reported     Pt reports pain was in the leg last week. Pt reports he saw MD last Thursday. He gave me a whole bunch of stuff for the knee. (Plica)? OBJECTIVE  Modalities Rationale:  N/a       42 min Therapeutic Exercise:  [x]  See flow sheet   Rationale:      increase ROM and increase strength to improve the patients ability to perform bending and lifting         With TE min Patient Education:  YES  Reviewed HEP, sleeping positioning, use of ice prn    []  Progressed/Changed HEP based on: Other Objective/Functional Measures: Add prone hip extension: 8 x bilaterally     Post Treatment Pain Level (on 0 to 10) scale:   0  / 10     ASSESSMENT  Assessment/Changes in Function:   Review use of HEP, activity modification, use of ice prn for pain management. Pt self progressing clams to 18 reps each. Pt reporting good pain relief after exercises.  Pt deferred ice to home.      []  See Progress Note/Recertification   Patient will continue to benefit from skilled PT services to modify and progress therapeutic interventions, address functional mobility deficits, address ROM deficits, address strength deficits, analyze and address soft tissue restrictions and instruct in home and community integration to attain remaining goals. Progress toward goals / Updated goals:  1) Patient performing daily HEP. -performing 3x per week  2) Patient will report 50% improvement in ability to perform LE functional tasks, ADL, work duties.   3) Patient to demo lumbar AROM FF to toes without pain to facilitate LE functional tasks, ADL, work duties.        PLAN  []  Upgrade activities as tolerated YES Continue plan of care   []  Discharge due to :    []  Other:      Therapist: Janna Sawyer PTA    Date: 3/9/2020 Time: 3:20 PM     Future Appointments   Date Time Provider Yamil Dominguez   3/9/2020  2:30 PM Jennifer De La Rosa, PTA [facility]   3/11/2020  3:00 PM Jennifer De La Rosa, PTA [facility]   3/13/2020  1:30 PM Jennifer De La Rosa, PTA [facility]   3/16/2020  2:30 PM Jennifer De La Rosa, PTA [facility]   3/18/2020  3:00 PM Sherie Haq, PT [facility]   3/20/2020  2:30 PM Sherie Haq, PT [facility]   3/23/2020  3:30 PM Sherie Haq, PT [facility]   3/25/2020  3:30 PM Sherie Haq, PT [facility]   3/27/2020  3:30 PM Sherie Haq, PT [facility]   3/30/2020  3:30 PM Jennifer De La Rosa, PTA [facility]   4/1/2020  3:30 PM Sherie Haq, PT [facility]   4/3/2020  3:00 PM Sherie Haq, PT [facility]

## 2020-03-11 ENCOUNTER — HOSPITAL ENCOUNTER (OUTPATIENT)
Dept: PHYSICAL THERAPY | Age: 35
Discharge: HOME | End: 2020-03-11
Payer: COMMERCIAL

## 2020-03-11 PROCEDURE — 97110 THERAPEUTIC EXERCISES: CPT

## 2020-03-11 PROCEDURE — 97140 MANUAL THERAPY 1/> REGIONS: CPT

## 2020-03-11 NOTE — PROGRESS NOTES
PHYSICAL THERAPY - DAILY TREATMENT NOTE    Patient Name: Iwona Stephens        Date: 3/11/2020  : 1985   YES Patient  Verified  Visit #:      18  Insurance: Payor: BLUE CROSS / Plan: [employer] [facility] / Product Type: PPO /      In time: 3:02  pm Out time: 4:06 pm    Total Treatment Time: 64     Medicare Time /BCBS Tracking (below)   Total Timed Codes (min): 54 1:1 Treatment Time:  23     TREATMENT AREA =  Back pain [M54.9]  Strain of muscle, fascia and tendon of lower back, initial encounter [S39.012A]    SUBJECTIVE  Pain Level (on 0 to 10 scale): 2-3  / 10- low back   Medication Changes/New allergies or changes in medical history, any new surgeries or procedures? NO    If yes, update Summary List   Subjective Functional Status/Changes:  []  No changes reported     Pt reports kind of like when I wake up in pain its going to be a bad day. OBJECTIVE   Modalities Rationale:     decrease pain and increase tissue extensibility to improve patient's ability to perform LE functional tasks and ADL                min []? Estim, type/location:                                                            []?  att     []?  unatt     []?  w/US     []?  w/ice    []?  w/heat     min []? Mechanical Traction: type/lbs                    []?  pro   []?  sup   []? int   []?  cont    []? before manual    []? after manual     min []? Ultrasound, settings/location:        min []? Iontophoresis w/ dexamethasone, location:                                                []?  take home patch       []? in clinic   10 min []? Ice     [x]? Heat    location/position: To low back in supine s/p eval     min []? Vasopneumatic Device, press/temp:       min []? Other:     [x]? Skin assessment post-treatment (if applicable):    [x]? intact    []? redness- no adverse reaction     []? redness  adverse reaction:             44 min Therapeutic Exercise:  [x]  See flow sheet   Rationale:      increase ROM and increase strength to improve the patients ability to perform bending and lifting     10 min: Manual Therapy; prone STM/DTM to lower lumbar paraspinals; Q-L release on right   Rationale: improving ROM in functional ADLs    With TE min Patient Education:  YES  Reviewed HEP, sleeping positioning, use of ice prn    []  Progressed/Changed HEP based on: Other Objective/Functional Measures:  FF: 90%-pain, Ext: 80%-slight pain, bilateral SB: 80% , decreased side glide right , slight pain toward the left,   Bilateral rotation: WNLs  Modified hamstring stretch from sitting to supine, add standing 3 way hip   Post Treatment Pain Level (on 0 to 10) scale:   0  / 10     ASSESSMENT  Assessment/Changes in Function:   Add manual to lower lumbar paraspinals to address decreased mobility, improve ROM, decrease mm tone and muscular tightness. Pt education in self stretching for HEP, review proper body mechanics     []  See Progress Note/Recertification   Patient will continue to benefit from skilled PT services to modify and progress therapeutic interventions, address functional mobility deficits, address ROM deficits, address strength deficits, analyze and address soft tissue restrictions and instruct in home and community integration to attain remaining goals. Progress toward goals / Updated goals:  1) Patient performing daily HEP. -performing 3x per week  2) Patient will report 50% improvement in ability to perform LE functional tasks, ADL, work duties.   3) Patient to demo lumbar AROM FF to toes without pain to facilitate LE functional tasks, ADL, work duties.        PLAN  []  Upgrade activities as tolerated YES Continue plan of care   []  Discharge due to :    []  Other:      Therapist: Toma Mohan PTA    Date: 3/11/2020 Time: 4:06  PM     Future Appointments   Date Time Provider Yamil Dominguez   3/11/2020  3:00 PM Aleksander Rice PTA [facility]   3/13/2020  1:30 PM Aleksander Rice PTA [facility]   3/16/2020 2:30 PM Fe Alt, PTA [facility]   3/18/2020  3:00 PM Eusebio Armenta, PT [facility]   3/20/2020  2:30 PM Eusebio Armenta, PT [facility]   3/23/2020  3:30 PM Eusebio Armenta, PT [facility]   3/25/2020  3:30 PM Eusebio Armenta, PT [facility]   3/27/2020  3:30 PM Eusebio Armenta, PT [facility]   3/30/2020  3:30 PM Ef Alt, PTA [facility]   4/1/2020  3:30 PM Eusebio Armenta, PT [facility]   4/3/2020  3:00 PM Eusebio Armenta, PT [facility]

## 2020-03-13 ENCOUNTER — HOSPITAL ENCOUNTER (OUTPATIENT)
Dept: PHYSICAL THERAPY | Age: 35
Discharge: HOME | End: 2020-03-13
Payer: COMMERCIAL

## 2020-03-13 PROCEDURE — 97140 MANUAL THERAPY 1/> REGIONS: CPT

## 2020-03-13 PROCEDURE — 97110 THERAPEUTIC EXERCISES: CPT

## 2020-03-13 NOTE — PROGRESS NOTES
PHYSICAL THERAPY - DAILY TREATMENT NOTE    Patient Name: Karena Og        Date: 3/13/2020  : 1985   YES Patient  Verified  Visit #:   8  of   18  Insurance: Payor: Seren[employer] Le[employer]anc / Plan: [employer] [facility] / Product Type: PPO /      In time: 11:59 am Out time: 1:08 pm   Total Treatment Time: 69     Medicare Time /BCBS Tracking (below)   Total Timed Codes (min): 59 1:1 Treatment Time:  33     TREATMENT AREA =  Back pain [M54.9]  Strain of muscle, fascia and tendon of lower back, initial encounter [S39.012A]    SUBJECTIVE  Pain Level (on 0 to 10 scale):  2  / 10   Medication Changes/New allergies or changes in medical history, any new surgeries or procedures? NO    If yes, update Summary List   Subjective Functional Status/Changes:  []  No changes reported     Pt reports yesterday morning was one of the better morning in a month.           OBJECTIVE  Modalities Rationale:     decrease edema, decrease inflammation, decrease pain and increase tissue extensibility to improve patient's ability to perform bending and lifting   min [] Estim, type/location:                                      []  att     []  unatt     []  w/US     []  w/ice    []  w/heat    min []  Mechanical Traction: type/lbs                   []  pro   []  sup   []  int   []  cont    []  before manual    []  after manual    min []  Ultrasound, settings/location:      min []  Iontophoresis w/ dexamethasone, location:                                               []  take home patch       []  in clinic   10 min []  Ice     [x]  Heat    location/position: Prone low back    min []  Vasopneumatic Device, press/temp:     min []  Other:    [x] Skin assessment post-treatment (if applicable):    [x]  intact    []  redness- no adverse reaction     []redness  adverse reaction:        49 min Therapeutic Exercise:  [x]  See flow sheet   Rationale:      increase ROM and increase strength to improve the patients ability to perform bending and lifting    10 min Manual Therapy: prone STM/DTM to lower lumbar paraspinals; Q-L release on right       Rationale:      decrease pain, increase ROM, increase tissue extensibility and decrease trigger points to improve patient's ability to perform bending and lifting       min Patient Education:  YES  Reviewed HEP   []  Progressed/Changed HEP based on: Other Objective/Functional Measures:  No exercise progression this session. Pt challenged by current program.      Post Treatment Pain Level (on 0 to 10) scale:   1  / 10     ASSESSMENT  Assessment/Changes in Function:     Pt education in positioning, hip hinge , golfers bend body mechanics in functional ADLs, use of HEP, positioning and activity modification for pain management. Pt may benefit from IFC with ice secondary to slow progress toward decreasing pain. Noting improving tissue mobility in lower lumbar paraspinals after manual release     []  See Progress Note/Recertification   Patient will continue to benefit from skilled PT services to modify and progress therapeutic interventions, address functional mobility deficits, address ROM deficits, address strength deficits, analyze and address soft tissue restrictions, analyze and cue movement patterns and instruct in home and community integration to attain remaining goals.    Progress toward goals / Updated goals:    Review body mechanics in lifting and bending     PLAN  []  Upgrade activities as tolerated YES Continue plan of care   []  Discharge due to :    []  Other:      Therapist: Latha Angel PTA    Date: 3/13/2020 Time: 1:08  PM     Future Appointments   Date Time Provider Yamil Dominguez   3/16/2020 11:00 AM Ashley Peralta PTA [facility]   3/19/2020 11:00 AM Ashley Peralta PTA [facility]   3/30/2020 11:00 AM Ashley Peralta PTA [facility]   4/2/2020 11:00 AM Ashley Peralta PTA [facility] skin normal color for race, warm, dry and intact.

## 2020-03-16 ENCOUNTER — APPOINTMENT (OUTPATIENT)
Dept: PHYSICAL THERAPY | Age: 35
End: 2020-03-16
Payer: COMMERCIAL

## 2020-03-16 ENCOUNTER — HOSPITAL ENCOUNTER (OUTPATIENT)
Dept: PHYSICAL THERAPY | Age: 35
Discharge: HOME | End: 2020-03-16
Payer: COMMERCIAL

## 2020-03-16 PROCEDURE — 97014 ELECTRIC STIMULATION THERAPY: CPT

## 2020-03-16 PROCEDURE — 97110 THERAPEUTIC EXERCISES: CPT

## 2020-03-16 PROCEDURE — 97140 MANUAL THERAPY 1/> REGIONS: CPT

## 2020-03-16 NOTE — PROGRESS NOTES
PHYSICAL THERAPY - DAILY TREATMENT NOTE    Patient Name: Alee See        Date: 3/16/2020  : 1985   YES Patient  Verified  Visit #:     Insurance: Payor: [employer] / Plan:  [facility] / Product Type: PPO /      In time: 11:07  am Out time: 12:32 pm   Total Treatment Time: 85     Medicare Time /BCBS Tracking (below)   Total Timed Codes (min): 75 1:1 Treatment Time:  38     TREATMENT AREA =  Back pain [M54.9]  Strain of muscle, fascia and tendon of lower back, initial encounter [S39.012A]    SUBJECTIVE  Pain Level (on 0 to 10 scale):  / 10   Medication Changes/New allergies or changes in medical history, any new surgeries or procedures? NO    If yes, update Summary List   Subjective Functional Status/Changes:  []  No changes reported     Pt reports  Pretty good. Using ice on the way to work. Still having trouble bending down for lifting.    No improvement since beginning PT       OBJECTIVE  Modalities Rationale:     decrease edema, decrease inflammation, decrease pain and increase tissue extensibility to improve patient's ability to perform bending and lifting  10 min [x] Estim, type/location: IFC low back, post hip prone                                     []  att     [x]  unatt     []  w/US     [x]  w/ice    []  w/heat    min []  Mechanical Traction: type/lbs                   []  pro   []  sup   []  int   []  cont    []  before manual    []  after manual    min []  Ultrasound, settings/location:      min []  Iontophoresis w/ dexamethasone, location:                                               []  take home patch       []  in clinic    min []  Ice     [x]  Heat    location/position:     min []  Vasopneumatic Device, press/temp:     min []  Other:    [x] Skin assessment post-treatment (if applicable):    [x]  intact    []  redness- no adverse reaction     []redness  adverse reaction:        53 min Therapeutic Exercise:  [x]  See flow sheet   Rationale:      increase ROM and increase strength to improve the patients ability to perform bending and lifting    10 min Manual Therapy: prone STM/DTM to lower lumbar paraspinals; Q-L release on right       Rationale:      decrease pain, increase ROM, increase tissue extensibility and decrease trigger points to improve patient's ability to perform bending and lifting      12 min Patient Education:  YES  Reviewed HEP, body mechanics in golfers bend, lifting techniques, hip hinge   []  Progressed/Changed HEP based on: Other Objective/Functional Measures: Add body mechanics and lifting techniques-see above  Trunk AROM: FF: 80%-pain  EXT: 10%  RSB:60%LSB: 65%  RR: 80 %  LR:80%    Post Treatment Pain Level (on 0 to 10) scale:   0  / 10     ASSESSMENT  Assessment/Changes in Function:   Pt education in proper body mechanics in bending, hip hinge, golfers bend and kneeling. Pt demonstrating improving form in hip hinge -progressing to squat to mat with good body mechanics during session. Add IFC with MH per POC secondary to slow progress toward all LTGs. []  See Progress Note/Recertification   Patient will continue to benefit from skilled PT services to modify and progress therapeutic interventions, address functional mobility deficits, address ROM deficits, address strength deficits, analyze and address soft tissue restrictions, analyze and cue movement patterns and instruct in home and community integration to attain remaining goals. Progress toward goals / Updated goals:  1) Patient performing daily HEP. 2) Patient will report 50% improvement in ability to perform LE functional tasks, ADL, work duties. -not met 0% improvement  3) Patient to demo lumbar AROM FF to toes without pain to facilitate LE functional tasks, ADL, work duties.        PLAN  []  Upgrade activities as tolerated YES Continue plan of care   []  Discharge due to :    []  Other:      Therapist: Luiz Dumont PTA    Date: 3/16/2020 Time: 12:32 pm     Future Appointments   Date Time Provider Yamil Dominguez   3/19/2020 11:00 AM Domo Sims PTA [facility]   3/30/2020 11:00 AM Domo Sims PTA [facility]   4/2/2020 11:00 AM Domo Sims PTA [facility]

## 2020-03-18 ENCOUNTER — APPOINTMENT (OUTPATIENT)
Dept: PHYSICAL THERAPY | Age: 35
End: 2020-03-18
Payer: COMMERCIAL

## 2020-03-19 ENCOUNTER — HOSPITAL ENCOUNTER (OUTPATIENT)
Dept: PHYSICAL THERAPY | Age: 35
Discharge: HOME | End: 2020-03-19
Payer: COMMERCIAL

## 2020-03-19 PROCEDURE — 97110 THERAPEUTIC EXERCISES: CPT

## 2020-03-19 PROCEDURE — 97014 ELECTRIC STIMULATION THERAPY: CPT

## 2020-03-19 PROCEDURE — 97140 MANUAL THERAPY 1/> REGIONS: CPT

## 2020-03-19 NOTE — PROGRESS NOTES
PHYSICAL THERAPY - DAILY TREATMENT NOTE    Patient Name: Diomedes Larson        Date: 3/19/2020  : 1985   YES Patient  Verified  Visit #:   10   of   18  Insurance: Payor: BLUE CROSS / Plan: [employer] [facility] / Product Type: PPO /      In time: 11:17 am Out time: 12:33 pm   Total Treatment Time: 56     Medicare Time /BCBS Tracking (below)   Total Timed Codes (min):  46 1:1 Treatment Time:  25     TREATMENT AREA =  Back pain [M54.9]  Strain of muscle, fascia and tendon of lower back, initial encounter [S39.012A]    SUBJECTIVE  Pain Level (on 0 to 10 scale):  / 10   Medication Changes/New allergies or changes in medical history, any new surgeries or procedures? NO    If yes, update Summary List   Subjective Functional Status/Changes:  []  No changes reported     Pt reports when I wake up its a three. Any kind of movement makes it sore.           OBJECTIVE  Modalities Rationale:     decrease edema, decrease inflammation, decrease pain and increase tissue extensibility to improve patient's ability to perform bending and lifting  10 min [x] Estim, type/location: IFC low back, post hip prone                                     []  att     [x]  unatt     []  w/US     [x]  w/ice    []  w/heat    min []  Mechanical Traction: type/lbs                   []  pro   []  sup   []  int   []  cont    []  before manual    []  after manual    min []  Ultrasound, settings/location:      min []  Iontophoresis w/ dexamethasone, location:                                               []  take home patch       []  in clinic    min []  Ice     []  Heat    location/position:     min []  Vasopneumatic Device, press/temp:     min []  Other:    [x] Skin assessment post-treatment (if applicable):    [x]  intact    []  redness- no adverse reaction     []redness  adverse reaction:        36 min Therapeutic Exercise:  [x]  See flow sheet   Rationale:      increase ROM and increase strength to improve the patients ability to perform bending and lifting     10 min Manual Therapy: prone STM/DTM to lower lumbar paraspinals; Q-L release on right     Rationale:      decrease pain, increase ROM, increase tissue extensibility and decrease trigger points to improve patient's ability to improve tissue mobility in functional ADLs      With TE min Patient Education:  YES  Reviewed HEP   []  Progressed/Changed HEP based on: Other Objective/Functional Measures:  Review proper body mechanics in hip hinge, quarter squat, golfers bending     Post Treatment Pain Level (on 0 to 10) scale:  0 / 10     ASSESSMENT  Assessment/Changes in Function:   Advanced core stabilization exercise per flow sheet, review proper hip hinge , bending body mechanics, pt education in diaphragmatic breathing technique     []  See Progress Note/Recertification   Patient will continue to benefit from skilled PT services to modify and progress therapeutic interventions, address functional mobility deficits, address ROM deficits, address strength deficits, analyze and address soft tissue restrictions, analyze and cue movement patterns and instruct in home and community integration to attain remaining goals. Progress toward goals / Updated goals:  1) Patient performing daily HEP. - goal in progress  2) Patient will report 50% improvement in ability to perform LE functional tasks, ADL, work duties. -not met 0% improvement  3) Patient to demo lumbar AROM FF to toes without pain to facilitate LE functional tasks, ADL, work duties.      PLAN  []  Upgrade activities as tolerated YES Continue plan of care   []  Discharge due to :    []  Other:      Therapist: Santosh Fritz PTA    Date: 3/19/2020 Time: 12:33 pm     Future Appointments   Date Time Provider Yamil Dominguez   3/19/2020 11:00 AM Sam Redman PTA [facility]   3/30/2020 11:00 AM Sam Redman PTA [facility]   4/2/2020 11:00 AM Sam Redman PTA [facility]

## 2020-03-20 ENCOUNTER — APPOINTMENT (OUTPATIENT)
Dept: PHYSICAL THERAPY | Age: 35
End: 2020-03-20
Payer: COMMERCIAL

## 2020-03-23 ENCOUNTER — APPOINTMENT (OUTPATIENT)
Dept: PHYSICAL THERAPY | Age: 35
End: 2020-03-23
Payer: COMMERCIAL

## 2020-03-25 ENCOUNTER — APPOINTMENT (OUTPATIENT)
Dept: PHYSICAL THERAPY | Age: 35
End: 2020-03-25
Payer: COMMERCIAL

## 2020-03-27 ENCOUNTER — APPOINTMENT (OUTPATIENT)
Dept: PHYSICAL THERAPY | Age: 35
End: 2020-03-27
Payer: COMMERCIAL

## 2020-03-30 ENCOUNTER — APPOINTMENT (OUTPATIENT)
Dept: PHYSICAL THERAPY | Age: 35
End: 2020-03-30
Payer: COMMERCIAL

## 2020-04-01 ENCOUNTER — APPOINTMENT (OUTPATIENT)
Dept: PHYSICAL THERAPY | Age: 35
End: 2020-04-01

## 2020-04-02 ENCOUNTER — APPOINTMENT (OUTPATIENT)
Dept: PHYSICAL THERAPY | Age: 35
End: 2020-04-02

## 2020-04-03 ENCOUNTER — APPOINTMENT (OUTPATIENT)
Dept: PHYSICAL THERAPY | Age: 35
End: 2020-04-03

## 2020-04-06 ENCOUNTER — DOCUMENTATION ONLY (OUTPATIENT)
Dept: PHYSICAL THERAPY | Age: 35
End: 2020-04-06

## 2020-04-06 NOTE — PROGRESS NOTES
[facility] CHECK-IN PHONE CALL    Patient Name: Nicholas Ray        Date: 2020  : 1985   yes Patient  Verified  Insurance: Payor: BLUE CROSS / Plan: [employer] [facility] [email or web address] / Product Type: PPO /      Start time: 12:54 pm End time: 12:56 pm     Detail of Conversation:  Spoke with patient via phone pt deferred e visit . Review HEP and body mechanics in ADLs. Pain range: 1/10 Still one spot in his back bothers me.        Follow-up Actions:    [x] Check-in via phone in 1-2 weeks  [] Provide updated HEP  [] Discharge no further need for check-ins (write DC note and send to physician)    Other:       Therapist: Mickie Nguyen PTA    Date: 2020 Time: 12:54 PM

## 2020-04-09 NOTE — PROGRESS NOTES
[facility] AT [address] [place] [address] - Phone: [phone]  Fax: [phone]  PROGRESS NOTE  Patient Name: Real Ray : 1985   Treatment/Medical Diagnosis: Back pain [M54.9]  Strain of muscle, fascia and tendon of lower back, initial encounter [J35.444D]   Referral Source: Maribell Starkey DO     Date of Initial Visit: 20 Attended Visits: 10 Missed Visits: 1     CURRENT STATUS    Thank you for sending your patient to be cared for in our facility. While we are STILL OPEN for essential patients, this pt has been placed on a temporarily hold due to the nationwide concerns over COVID-19. The physical therapist has issued HEP and therapy staff will continue to contact pt every 1-2 weeks via phone to monitor progress as the patient feels necessary. If the patient desires to return, we plan to resume physical therapy once concerns improve and will be performing reassessments upon return. Thank you and please feel free to reach out to use with any questions or concerns. If you have any questions/comments please contact us directly at [phone]. Thank you for allowing us to assist in the care of your patient. LPTA Signature: Qasim GALVEZ Date: 2020   PT Signature: Kelly Lieberman PT Time: 4:02 PM   NOTE TO PHYSICIAN:  PLEASE COMPLETE THE ORDERS BELOW AND FAX TO   [facility]: [phone]. If you are unable to process this request in 24 hours please contact our office: [phone].    ___ I have read the above report and request that my patient continue as recommended.   ___ I have read the above report and request that my patient continue therapy with the following changes/special instructions:_________________________________________________________   ___ I have read the above report and request that my patient be discharged from therapy.      Physician Signature:        Date:       Time:

## 2020-06-16 NOTE — PROGRESS NOTES
[facility] AT [address], [address], [place] [address] - Phone: [phone]  Fax: [phone] SUMMARY  Patient Name: Kaelyn Olivo : 1985   Treatment/Medical Diagnosis: Back pain [M54.9]  Strain of muscle, fascia and tendon of lower back, initial encounter [D16.313Q]   Referral Source: Renetta Ruano DO     Date of Initial Visit: 20 Attended Visits: 10 Missed Visits: 0     SUMMARY OF TREATMENT  Patient was put on hold after 3-19-20 visit secondary to COVID-19 restrictions. Follow up phone call from office  on 20. Patient did not return calls from office for further follow up. CURRENT STATUS  Patient was unable to be formally reassessed secondary to not returning to PT after 3-19-20 visit. Goal/Measure of Progress Goal Met? 1. Patient performing daily HEP. - goal in progress. Status at last Eval: dependent Current Status: Pt instructed in initial HEP. Partially met   2. Patient will report 50% improvement in ability to perform LE functional tasks, ADL, work duties. -   Status at last Eval: na Current Status: Unable to formally reassess. no   3. Patient to demo lumbar AROM FF to toes without pain to facilitate LE functional tasks, ADL, work duties. Status at last Eval: Trunk AROM: FF: 80%-pain  EXT: 10%  RSB:60%LSB: 65%  RR: 80 %  LR:80- from 3-16-20 visit. Current Status: Unable to formally reassess. no     RECOMMENDATIONS  Discontinue therapy. Progressing towards or have reached established goals. Did not return. If you have any questions/comments please contact us directly at [phone]. Thank you for allowing us to assist in the care of your patient. LPTA Signature: Júnior Cadet Date: 3-19-20   Therapist Signature:  Nidia Bernal PT Time: 2:06 PM

## 2020-08-20 ENCOUNTER — OFFICE VISIT (OUTPATIENT)
Dept: ORTHOPEDIC SURGERY | Age: 35
End: 2020-08-20

## 2020-08-20 VITALS
WEIGHT: 217 LBS | HEART RATE: 50 BPM | SYSTOLIC BLOOD PRESSURE: 123 MMHG | HEIGHT: 74 IN | RESPIRATION RATE: 15 BRPM | TEMPERATURE: 97.3 F | DIASTOLIC BLOOD PRESSURE: 86 MMHG | BODY MASS INDEX: 27.85 KG/M2

## 2020-08-20 DIAGNOSIS — M43.17 SPONDYLOLISTHESIS AT L5-S1 LEVEL: Primary | ICD-10-CM

## 2020-08-20 DIAGNOSIS — M99.03 LUMBAR REGION SOMATIC DYSFUNCTION: ICD-10-CM

## 2020-08-20 DIAGNOSIS — S39.012D LUMBOSACRAL STRAIN, SUBSEQUENT ENCOUNTER: ICD-10-CM

## 2020-08-20 DIAGNOSIS — M99.04 SACRAL REGION SOMATIC DYSFUNCTION: ICD-10-CM

## 2020-08-20 DIAGNOSIS — M99.05 PELVIC SOMATIC DYSFUNCTION: ICD-10-CM

## 2020-08-20 RX ORDER — HYDROCODONE BITARTRATE AND ACETAMINOPHEN 5; 325 MG/1; MG/1
1 TABLET ORAL
Qty: 21 TAB | Refills: 0 | Status: SHIPPED | OUTPATIENT
Start: 2020-08-20 | End: 2020-08-27

## 2020-08-20 NOTE — PROGRESS NOTES
HISTORY OF PRESENT ILLNESS    Otto Short 1985 is a 38y.o. year old male comes in today to be evaluated and treated for: back pain    Since last appt has noticed pain low back since JAN2020. Had improved with manip and PT but ended due to COVID and no appt here since March. Pain level 1/10. Using ibuprofen here and ther with benefit. Will get twinges with certain activities that flare it bad. IMAGING: XR lumbar 1/21/2020  IMPRESSION: Grade 1 retrolisthesis L5 with potential L5 spondylolysis. DDD at L5-S1. Past Surgical History:   Procedure Laterality Date    HX ACL RECONSTRUCTION      20 yrs ago cant remember what side per pt statement      Social History     Socioeconomic History    Marital status: SINGLE     Spouse name: Not on file    Number of children: Not on file    Years of education: Not on file    Highest education level: Not on file   Tobacco Use    Smoking status: Never Smoker    Smokeless tobacco: Never Used   Substance and Sexual Activity    Alcohol use: Yes     Alcohol/week: 1.0 standard drinks     Types: 1 Cans of beer per week     Comment: occasionally    Drug use: No   Social History Narrative    ** Merged History Encounter **          Current Outpatient Medications   Medication Sig Dispense Refill    omalizumab (XOLAIR) 150 mg solr 300 mg.  butalbital-acetaminophen-caff (FIORICET) -40 mg per capsule TAKE 1 TAB BY MOUTH 3 TIMES DAILY AS NEEDED. 3    meloxicam (MOBIC) 15 mg tablet Take 1 tab daily as needed pain with food. 90 Tab 0    cyclobenzaprine (FLEXERIL) 10 mg tablet Take 1 Tab by mouth three (3) times daily as needed for Muscle Spasm(s). 10 Tab 0    diazePAM (VALIUM) 2 mg tablet Take 2.5 Tabs by mouth every eight (8) hours as needed (spasm).  Max Daily Amount: 15 mg. 30 Tab 0    XOLAIR 150 mg solr       omeprazole (PRILOSEC) 20 mg capsule TAKE 1 CAPSULE TWICE A DAY---30 MINS BEFOER BREAKFAST AD N DINNER ORALLY 90 DAYS  3    rosuvastatin (CRESTOR) 10 mg tablet TAKE 1 TAB BY MOUTH EVERY NIGHT AT BEDTIME.  0     Past Medical History:   Diagnosis Date    Decreased urine stream     Dyslipidemia     Erythema multiforme     Heartburn     High cholesterol     Nocturia     Urinary frequency     Urinary urgency     Weak urinary stream      Family History   Problem Relation Age of Onset    No Known Problems Mother     No Known Problems Father     Other Brother      ROS:  No numb, tingle, swell, bruise, incont, fever. Objective:  /86   Pulse (!) 50   Temp 97.3 °F (36.3 °C)   Resp 15   Ht 6' 2\" (1.88 m)   Wt 217 lb (98.4 kg)   BMI 27.86 kg/m²   GEN:  Appears stated age in NAD. NEURO:  Sensation intact to light touch.  Reflexes +2/4 patellar and Achilles bilaterally. M/S:  Examined seated and supine.  Slump negative. Standing flexion test positive left  Stork positive left.  ASIS low left  Iliac crests equal bilaterally Pubes equal bilaterally Medial malleolus low left  Sacral base posterior left  TRISH low left  Sphinx test Positive left TTA at L4, 5 on left worse flexion Rib(s) no TTP and equal LE Strength +5/5 bilaterally Piriformis normal bilaterally  EXT:  no clubbing/cyanosis.  no edema. SKIN: Warm & dry w/o rash. Assessment/Plan:     ICD-10-CM ICD-9-CM    1. Spondylolisthesis at L5-S1 level  M43.17 756.12 HYDROcodone-acetaminophen (Norco) 5-325 mg per tablet   2. Lumbosacral strain, subsequent encounter  S39.012D V58.89 HYDROcodone-acetaminophen (Norco) 5-325 mg per tablet     846.0    3. Sacral region somatic dysfunction  M99.04 739.4 [facility] OSTEOPATHIC MANIP,3-4 BODY REGN   4. Lumbar region somatic dysfunction  M99.03 739.3 VA OSTEOPATHIC MANIP,3-4 BODY REGN   5. Pelvic somatic dysfunction  M99.05 739.5 VA OSTEOPATHIC MANIP,3-4 BODY REGN       Patient (or guardian if minor) verbalizes understanding of evaluation and plan. Verbal consent obtained. Lumbar, Pelvic and Sacral SD treated with ME and HVLA.   Correction of previous malalignments verified after Tx. Pt tolerated well. Notes improvement of Sx and pain is now rated 1/10. HEP/stretches daily. Discussed stretching/strengthening/posture. No desire for PT as no benefit prior. Declined PMR and will RTC as needed. Norco PRN severe.

## 2020-08-20 NOTE — PATIENT INSTRUCTIONS
Continue with stretches, use medication as prescribed, and return to the office as needed. Search YouTube for my channel: 
 
Dr. Parson Achilles Low back/Piriformis

## 2020-08-20 NOTE — PROGRESS NOTES
AVS reviewed: YES  HEP: Pt declined demo  Resources Provided: YES Printed Rx  Patient questions/concerns answered: YES.  Advised on some posture cues & ex  Patient verbalized understanding of treatment plan: YES

## 2021-03-15 ENCOUNTER — OFFICE VISIT (OUTPATIENT)
Dept: ORTHOPEDIC SURGERY | Age: 36
End: 2021-03-15
Payer: COMMERCIAL

## 2021-03-15 VITALS
RESPIRATION RATE: 15 BRPM | HEART RATE: 68 BPM | SYSTOLIC BLOOD PRESSURE: 116 MMHG | WEIGHT: 201 LBS | DIASTOLIC BLOOD PRESSURE: 86 MMHG | HEIGHT: 74 IN | TEMPERATURE: 97.3 F | BODY MASS INDEX: 25.8 KG/M2

## 2021-03-15 DIAGNOSIS — M51.36 DEGENERATION, INTERVERTEBRAL DISC, LUMBAR: ICD-10-CM

## 2021-03-15 DIAGNOSIS — M99.05 PELVIC SOMATIC DYSFUNCTION: ICD-10-CM

## 2021-03-15 DIAGNOSIS — G56.11 PRONATOR TERES SYNDROME, RIGHT: ICD-10-CM

## 2021-03-15 DIAGNOSIS — M99.04 SACRAL REGION SOMATIC DYSFUNCTION: ICD-10-CM

## 2021-03-15 DIAGNOSIS — M99.03 LUMBAR REGION SOMATIC DYSFUNCTION: ICD-10-CM

## 2021-03-15 DIAGNOSIS — M43.17 SPONDYLOLISTHESIS AT L5-S1 LEVEL: Primary | ICD-10-CM

## 2021-03-15 PROCEDURE — 98926 OSTEOPATH MANJ 3-4 REGIONS: CPT | Performed by: FAMILY MEDICINE

## 2021-03-15 PROCEDURE — 99214 OFFICE O/P EST MOD 30 MIN: CPT | Performed by: FAMILY MEDICINE

## 2021-03-15 RX ORDER — IBUPROFEN 200 MG
800 TABLET ORAL
COMMUNITY
End: 2021-03-31 | Stop reason: SINTOL

## 2021-03-15 RX ORDER — IBUPROFEN 800 MG/1
800 TABLET ORAL
Qty: 270 TAB | Refills: 0 | Status: SHIPPED | OUTPATIENT
Start: 2021-03-15 | End: 2021-06-24

## 2021-03-15 RX ORDER — HYDROCODONE BITARTRATE AND ACETAMINOPHEN 5; 325 MG/1; MG/1
1 TABLET ORAL
Qty: 21 TAB | Refills: 0 | Status: SHIPPED | OUTPATIENT
Start: 2021-03-15 | End: 2021-03-22

## 2021-03-15 NOTE — PATIENT INSTRUCTIONS
Follow up with Dr. Manoj Saab. Complete home exercises daily. Search YouTube for my channel: 
 
Dr. Chidi Tyler Low back/Piriformis

## 2021-03-15 NOTE — PROGRESS NOTES
AVS reviewed: YES  HEP: AT reviewed  Resources Provided: YES Both Videos & Photos  Patient questions/concerns answered: YES pt had question about one of the exercises.   Patient verbalized understanding of treatment plan: YES
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
## 2021-03-31 ENCOUNTER — OFFICE VISIT (OUTPATIENT)
Dept: ORTHOPEDIC SURGERY | Age: 36
End: 2021-03-31
Payer: COMMERCIAL

## 2021-03-31 VITALS
HEIGHT: 74 IN | BODY MASS INDEX: 25.67 KG/M2 | OXYGEN SATURATION: 100 % | SYSTOLIC BLOOD PRESSURE: 109 MMHG | TEMPERATURE: 97.1 F | DIASTOLIC BLOOD PRESSURE: 79 MMHG | WEIGHT: 200 LBS | HEART RATE: 72 BPM

## 2021-03-31 DIAGNOSIS — M51.37 DDD (DEGENERATIVE DISC DISEASE), LUMBOSACRAL: Primary | ICD-10-CM

## 2021-03-31 DIAGNOSIS — M43.16 SPONDYLOLISTHESIS AT L4-L5 LEVEL: ICD-10-CM

## 2021-03-31 PROCEDURE — 99204 OFFICE O/P NEW MOD 45 MIN: CPT | Performed by: PHYSICAL MEDICINE & REHABILITATION

## 2021-03-31 PROCEDURE — 72114 X-RAY EXAM L-S SPINE BENDING: CPT | Performed by: PHYSICAL MEDICINE & REHABILITATION

## 2021-03-31 RX ORDER — BACLOFEN 10 MG/1
5-10 TABLET ORAL
Qty: 60 TAB | Refills: 1 | Status: SHIPPED | OUTPATIENT
Start: 2021-03-31 | End: 2021-04-28
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
## 2021-03-31 NOTE — PATIENT INSTRUCTIONS
Low Back Pain: Exercises Introduction Here are some examples of exercises for you to try. The exercises may be suggested for a condition or for rehabilitation. Start each exercise slowly. Ease off the exercises if you start to have pain. You will be told when to start these exercises and which ones will work best for you. How to do the exercises Press-up 1. Lie on your stomach, supporting your body with your forearms. 2. Press your elbows down into the floor to raise your upper back. As you do this, relax your stomach muscles and allow your back to arch without using your back muscles. As your press up, do not let your hips or pelvis come off the floor. 3. Hold for 15 to 30 seconds, then relax. 4. Repeat 2 to 4 times. Alternate arm and leg (bird dog) exercise Do this exercise slowly. Try to keep your body straight at all times, and do not let one hip drop lower than the other. 1. Start on the floor, on your hands and knees. 2. Tighten your belly muscles. 3. Raise one leg off the floor, and hold it straight out behind you. Be careful not to let your hip drop down, because that will twist your trunk. 4. Hold for about 6 seconds, then lower your leg and switch to the other leg. 5. Repeat 8 to 12 times on each leg. 6. Over time, work up to holding for 10 to 30 seconds each time. 7. If you feel stable and secure with your leg raised, try raising the opposite arm straight out in front of you at the same time. Knee-to-chest exercise 1. Lie on your back with your knees bent and your feet flat on the floor. 2. Bring one knee to your chest, keeping the other foot flat on the floor (or keeping the other leg straight, whichever feels better on your lower back). 3. Keep your lower back pressed to the floor. Hold for at least 15 to 30 seconds. 4. Relax, and lower the knee to the starting position. 5. Repeat with the other leg. Repeat 2 to 4 times with each leg.  
6. To get more stretch, put your other leg flat on the floor while pulling your knee to your chest.   
Curl-ups 1. Lie on the floor on your back with your knees bent at a 90-degree angle. Your feet should be flat on the floor, about 12 inches from your buttocks. 2. Cross your arms over your chest. If this bothers your neck, try putting your hands behind your neck (not your head), with your elbows spread apart. 3. Slowly tighten your belly muscles and raise your shoulder blades off the floor. 4. Keep your head in line with your body, and do not press your chin to your chest. 
5. Hold this position for 1 or 2 seconds, then slowly lower yourself back down to the floor. 6. Repeat 8 to 12 times. Pelvic tilt exercise 1. Lie on your back with your knees bent. 2. \"Brace\" your stomach. This means to tighten your muscles by pulling in and imagining your belly button moving toward your spine. You should feel like your back is pressing to the floor and your hips and pelvis are rocking back. 3. Hold for about 6 seconds while you breathe smoothly. 4. Repeat 8 to 12 times. Heel dig bridging 1. Lie on your back with both knees bent and your ankles bent so that only your heels are digging into the floor. Your knees should be bent about 90 degrees. 2. Then push your heels into the floor, squeeze your buttocks, and lift your hips off the floor until your shoulders, hips, and knees are all in a straight line. 3. Hold for about 6 seconds as you continue to breathe normally, and then slowly lower your hips back down to the floor and rest for up to 10 seconds. 4. Do 8 to 12 repetitions. Hamstring stretch in doorway 1. Lie on your back in a doorway, with one leg through the open door. 2. Slide your leg up the wall to straighten your knee. You should feel a gentle stretch down the back of your leg. 3. Hold the stretch for at least 15 to 30 seconds. Do not arch your back, point your toes, or bend either knee.  Keep one heel touching the floor and the other heel touching the wall. 4. Repeat with your other leg. 5. Do 2 to 4 times for each leg. Hip flexor stretch 1. Kneel on the floor with one knee bent and one leg behind you. Place your forward knee over your foot. Keep your other knee touching the floor. 2. Slowly push your hips forward until you feel a stretch in the upper thigh of your rear leg. 3. Hold the stretch for at least 15 to 30 seconds. Repeat with your other leg. 4. Do 2 to 4 times on each side. Wall sit 1. Stand with your back 10 to 12 inches away from a wall. 2. Lean into the wall until your back is flat against it. 3. Slowly slide down until your knees are slightly bent, pressing your lower back into the wall. 4. Hold for about 6 seconds, then slide back up the wall. 5. Repeat 8 to 12 times. Follow-up care is a key part of your treatment and safety. Be sure to make and go to all appointments, and call your doctor if you are having problems. It's also a good idea to know your test results and keep a list of the medicines you take. Where can you learn more? Go to http://www.gray.com/ Enter J899 in the search box to learn more about \"Low Back Pain: Exercises. \" Current as of: March 2, 2020               Content Version: 12.6 © 2871-8060 [employer]. Care instructions adapted under license by [employer] (which disclaims liability or warranty for this information). If you have questions about a medical condition or this instruction, always ask your healthcare professional. [place] [address] any warranty or liability for your use of this information. Learning About Degenerative Disc Disease What is degenerative disc disease? Degenerative disc disease isn't really a disease. It's a term used to describe the normal changes in your spinal discs as you age.  Spinal discs are small, spongy discs that separate the bones (vertebrae) that make up the spine. The discs act as shock absorbers for the spine. They let your spine flex, bend, and twist. 
Degenerative disc disease can take place in one or more places along the spine. It most often occurs in the discs in the lower back and the neck. The changes in the discs can cause back and neck pain. They can also lead to osteoarthritis, a herniated disc, or spinal stenosis. What causes it? As we age, our spinal discs break down, or degenerate. This breakdown causes the symptoms of degenerative disc disease in some people. When the discs break down, they can lose fluid and dry out, and their outer layers can have tiny cracks or tears. This leads to less padding and less space between the bones in the spine. The body reacts to this by making bony growths on the spine called bone spurs. These spurs can press on the spinal nerve roots or spinal cord. This can cause pain and can affect how well the nerves work. These changes in the discs are more likely to occur if you smoke, do heavy physical work (such as repeated heavy lifting), or are very overweight. A sudden injury may also cause changes to occur. What are the symptoms? Many people with degenerative disc disease have no pain. But others have severe pain or other symptoms that limit their activities. Some of the most common symptoms are: 
· Pain in the back or neck. Where the pain occurs depends on which discs are affected. · Pain that gets worse when you move, such as when you bend over, reach up, or twist. 
· Pain that may occur in the rear end (buttocks), arm, or leg if a nerve is pinched. · Numbness or tingling in your arm or leg. The pain may start after a major injury (such as from a car accident), a minor injury (such as a fall from a low height), or a normal motion (such as bending over to pick something up). It may also start gradually for no known reason and get worse over time. How is it diagnosed?  
A doctor can often diagnose degenerative disc disease while doing a physical exam. If your exam shows no signs of a serious condition, imaging tests (such as an X-ray) aren't likely to help your doctor find the cause of your symptoms. Sometimes degenerative disc disease is found when an X-ray is taken for another reason, such as an injury or other health problem. But even if the doctor finds degenerative disc disease, that doesn't always mean that you will have symptoms. How is degenerative disc disease treated? Self-care may be all you need to relieve pain caused by disc changes. This may include using ice or heat and taking over-the-counter medicines. Your doctor can prescribe stronger medicines if needed. If you develop health problems such as osteoarthritis, a herniated disc, or spinal stenosis, you may need other treatments. These include physical therapy and exercises for strengthening and stretching the back. In some cases, surgery may be recommended. It usually involves removing the damaged disc. In some cases, the bone is then permanently joined (fused) to protect the spinal cord. In rare cases, an artificial disc may be used to replace the disc that is removed. How can you care for yourself? Here are some things you can do to help manage pain from degenerative disc disease. · Use ice or heat (whichever feels better) on the affected area. ? Put ice or a cold pack on the area for 10 to 20 minutes at a time. Put a thin cloth between the ice and your skin. ? Put a warm water bottle, a heating pad set on low, or a warm cloth on your back. Put a thin cloth between the heating pad and your skin. Do not go to sleep with a heating pad on your skin. · Ask your doctor if you can take an over-the-counter pain medicine. These include acetaminophen (such as Tylenol) and nonsteroidal anti-inflammatory drugs, such as ibuprofen or naproxen. Your doctor can prescribe stronger medicines if needed. Be safe with medicines.  Read and follow all instructions on the label. · Get some exercise every day. Exercise is one of the best ways to help your back feel better and stay better. It's best to start each exercise slowly. You may notice a little soreness, and that's okay. But if an exercise makes your pain worse, stop doing it. Here are things you can try: 
? Walking. It's the simplest and maybe the best activity for your back. It gets your blood moving and helps your muscles stay strong. ? Exercises that gently stretch and strengthen your stomach, back, and leg muscles. The stronger those muscles are, the better they're able to protect your back. Follow-up care is a key part of your treatment and safety. Be sure to make and go to all appointments, and call your doctor if you are having problems. It's also a good idea to know your test results and keep a list of the medicines you take. Where can you learn more? Go to http://www.gray.com/ Enter P936 in the search box to learn more about \"Learning About Degenerative Disc Disease. \" Current as of: March 2, 2020               Content Version: 12.6 © 8652-4176 [employer]. Care instructions adapted under license by [employer] (which disclaims liability or warranty for this information). If you have questions about a medical condition or this instruction, always ask your healthcare professional. [address] any warranty or liability for your use of this information.

## 2021-03-31 NOTE — PROGRESS NOTES
Verbal order entered per Dr. Ally Warren as documented on blue sheet: baclofen 10 mg - take 0.5-1 tab PO BID prn pain/spasms.  Disp #60, 1 RF

## 2021-03-31 NOTE — PROGRESS NOTES
Cora Taylor presents today for   Chief Complaint   Patient presents with    Back Pain       Is someone accompanying this pt? no    Is the patient using any DME equipment during OV? no    Depression Screening:  3 most recent PHQ Screens 3/31/2021   Little interest or pleasure in doing things Not at all   Feeling down, depressed, irritable, or hopeless Not at all   Total Score PHQ 2 0       Coordination of Care:  1. Have you been to the ER, urgent care clinic since your last visit? no  Hospitalized since your last visit? no    2. Have you seen or consulted any other health care providers outside of the [address] [place] [address] since your last visit? Yes, ortho. Include any pap smears or colon screening.  no

## 2021-04-02 PROBLEM — M51.37 DDD (DEGENERATIVE DISC DISEASE), LUMBOSACRAL: Status: ACTIVE | Noted: 2021-04-02

## 2021-04-27 ENCOUNTER — HOSPITAL ENCOUNTER (OUTPATIENT)
Dept: PHYSICAL THERAPY | Age: 36
Discharge: HOME | End: 2021-04-27
Attending: NURSE PRACTITIONER
Payer: COMMERCIAL

## 2021-04-27 PROCEDURE — 97162 PT EVAL MOD COMPLEX 30 MIN: CPT

## 2021-04-27 PROCEDURE — 97112 NEUROMUSCULAR REEDUCATION: CPT

## 2021-04-27 PROCEDURE — 97110 THERAPEUTIC EXERCISES: CPT

## 2021-04-27 NOTE — PROGRESS NOTES
PHYSICAL THERAPY - DAILY TREATMENT NOTE  Patient Name: Kamryn Ibarra        Date: 2021  : 1985   [x]  Patient  Verified  Visit #:   1     Insurance: Payor: BLUE CROSS / Plan: [facility] / Product Type: PPO /      In time:   3:30          Out time:   4:15 Total Treatment Time (min):   45   Medicare Time Tracking (below)   Total Timed Codes (min):  23 1:1 Treatment Time:  23     SUBJECTIVE    Pain Level (on 0 to 10 scale):  1  / 10   Medication Changes/New allergies or changes in medical history, any new surgeries or procedures? []  No    []  Yes   If yes, update Summary List:    Subjective Functional Status/Changes:  []  No changes reported       HISTORY    Present Symptoms: Chronic LBP    Present since:  2 years  [] Improving []  Unchanging []  Worsening        Commenced as a result of:    or  [x]  No apparent reason   Reports was treated for ~3 months in , denies any effect. Was in [place] in [place] for ~1 month, no relief. Reports having \"shocks\" in L/S in the new year. Admits to not doing his HEP as often (planks and stretches)  Reports this is his last chance before he considers surgery.       Symptoms at onset:  [] Back []  Thigh []  Leg     Constant symptoms:  [] Back []  Thigh []  Leg       Intermittent symptoms:  [] Back []  Thigh []  Leg     Worse:  [x] Bending []  Walking []  Lying   [x] Sitting/ Rising []  Standing [] When still   []  Am/ as the day progresses/ pm []  On the move []Other:- turning over in bed, sneezing     Other:      Better:   [] Bending []  Walking []  Lying   [] Sitting/ Rising []  Standing [] When still   []  Am/ as the day progresses/ pm []  On the move []Other:       Other:    Disturbed sleep: [] No    [x] Yes       Sleeping Postures:  []  Prone []  Supine   []  R side [] L side    [x] Toss and Turn [] OOB     Surface: N/A []   [] Soft []  Firm []  Saggy     Current Treatment: ibuprofen, baclofen    SPECIFIC QUESTIONS    [] Cough [] Deep breath []  -ve   [x] Sneeze [x]  + ve      Bladder:  [x] Normal []  Abnormal     Gait:  [x] Normal []  Abnormal     General Health:  [x] Good []  Fair []  Poor     Imaging:   [] Yes [x]  No       Results if yes:   6)Intolerance of reactions to treatment(s)? [] Yes []  No       7) Emergency admission(s) to hospital with simple backache?  [] Yes []  No     Reports recurrent dizziness ~1x/month with apparent cause. 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, odd positions,     Leisure: Mechanical Stresses: working on cars   Functional disability score- See FS FOTO score       OBJECTIVE  EXAMINATION  Posture:  Sitting  [] Good []  Fair [x]  Poor     Standing:  [] Good []  Fair []  Poor     Lordosis:  [] Red []  Acc []  Normal       Lateral shift:  [] Right []  Left []  Nil     Correction of posture:  [x] Better [] Worse []  No effect     Other observations:      Neurological:    Myotome Level Muscles Nerve Reflex Sensation Action   L1-L3 Iliopsoas T12/L1-3  Quadriceps L2-4  Adductors L2-L4 (Iliacus)- Femoral  Femoral  Obturator/Sciatic N/A  L3-4 = [place] L1- Inguinal Crease  L2- Anterior Thigh  L3- Anterior Thigh above knee Hip Flexion  Knee Extension   L4 Tibialis anterior L4&5   Deep Peroneal Patella Anterior Knee Suprapatellar Ankle Dorsiflexion   L5 Extensor Hallucis Longus  Extensor Digitorum Lungus  Gluteus Medius Deep Peroneal         Superior Gluteal None Reliable Dorsum of Foot/Great Toe  Anterior Shank Extensor  to Great Toe        Hip Internal Rotation   S1 Peroneus Longus  Gastrocnemius & Soleus  Gluteus [place] Superficial Peroneal  Tibial    Inferior Gluteal Achilles Tendon Lateral Shank around Lateral Malleolus  Lateral Aspect/Dorsum of GT   Plantar Flexion      Hip Extension   Notable findings from above: NE  Dural signs:      [place] String:       [x] R  [] +    [] -    [] L    [] +    [] -      ASLR:              [x] R  [] +    [] -    [x] L    [] +    [] -  0-35= no dural movement, tension onset to sciatic roots at 35 degrees. Sciatic roots tense over intervertebral discs 35-70 degrees. > 70 degrees practically no further deformation of roots. Bragard's Sign [x] R  [] +    [] -    [x] L    [] +    [] - (Take off flexion, then dorsiflex)      Edgar's Sign   R  [x] +    [] -          [x] L    [] +    [] - (Take off flexion, then have patient flex neck)    Slump test :       [x] R   [] +    [] -    [x] L    [] +    [] - (sensitivity 44-70%; specificity 23-66%)    L/S Movement loss Gadiel Mod Min Nil Pain   Flexion    x NE   Extension  x   L/S   Side Gliding R    x L/S   Side Gliding L    x L/S       Test movements:  Describe effects on present pain- During: produces, abolishes, increases, decreases, no effect, centralising, peripheralising. After: better, worse, no better, no worse, no effect, centralised, peripheralised. Symptoms during testing Symptoms after testing Inc. ROM Dec. ROM No Effect   Pretest sx's  Standing No symptoms       FIS Produce L/S NBNW []   []   []     Rep FIS Produce W, left L/S > Right L/S []   []   []     EIS C NBNW []   []   []     Rep EIS C C []   []   []     Pretest sx's lying Left L/S in prone.         JACKLYN increase W []   []   []     Rep JACKLYN increase W []   []   []     EIL C NBNW []   []   []     Rep EIL C NBNW []   []   []       Other Tests:    [place]:  Gaenslen's: [x] R    [] +    [] -   [x] L    [] +    [] -     Compression: [x] R    [] +    [] -    [x] L    [] +    [] -      Gapping/Distraction:  [] +    [] -     Thigh Thrust: [x] R [] +    [] -    [x] L    [] +    [] -     Sacral Thrust  [x] R [] +    [] -    [x] L    [] +    [] -          Hip: Alver Stains:  [x] R [] +    [] -    [x] L    [] +    [] -     Scour:  [x] R [] +    [] -    [x] L    [] +    [] -     Piriformis: [x] R [] +    [] -    [x] L    [] +    [] -          Deficits: Hamstrings 90/90[de-identified] [x] R [] +    [] -    [x] L    [] +    [] -     Javier:    [x] R  [] +    [] -   [x] L    [] +    [] - Therapeutic Procedures:  Min Procedure Specifics + Rationale   n/a [x]  Patient Education (performed throughout session) [x] Review HEP    [] Progressed/Changed HEP based on:   [] proper performance and advancement of Therex/TA   [] reduction in pain level    [] increased functional capacity       [] change in directional preference   8(8) [x] Therapeutic Exercise   [x]  See Flowsheet   Rationale: increase ROM and increase strength to improve the patients ability to participate in ADL's    15(15) [x] Neuromuscular Re-ed   [x]  See Flowsheet    Rationale: increase ROM, increase strength, improve coordination, improve balance and increase proprioception  to improve the patients ability to safely participate in ADL's         Post Treatment Pain Level (on 0 to 10) scale:   1  / 10     ASSESSMENT      min Patient Education:  YES  Reviewed HEP   []  Progressed/Changed HEP based on:          ASSESSMENT    Assessment  Justification for Eval Code Complexity:  Patient History (low 0, mod 1-2, high 3-4): mod  Examination (low 1-2, mod 3+, high 4+): high  Clinical Presentation (low stable or uncomplicated, mod evolving or changing, high unstable or unpredictable): mod  Clinical Decision Making (low , mod 26-74, high 1-25): FOTO mod      []  See Progress Note/Recertification   Patient will continue to benefit from skilled PT services to : SEE POC   Progress toward goals / Updated goals:    See POC     PLAN    []  Upgrade activities as tolerated YES Continue plan of care   []  Discharge due to :    [x]  Other: Initiate POC     Therapist: Tuyet Ruiz \"TAMIKA\" RAJESH Aaron, Cert. SERENA, CertSolomon GILBERT, Cert. SMT    Date: 4/27/2021 Time: 3:38 PM   No future appointments.

## 2021-04-27 NOTE — PROGRESS NOTES
[address] [facility] PHYSICAL THERAPY  [address] [facility] Jono German, [address] - Phone: [phone]  Fax: [phone] / [facility] [facility]  Patient Name: Alexia Fuentes : 1985   Medical   Diagnosis: DDD (degenerative disc disease), lumbosacral [M51.37] Treatment Diagnosis: Chronic LBP   Onset Date: ~2 years     Referral Source: Ronnie Aldridge NP Start of Care [facility]): 2021   Prior Hospitalization: See medical history Provider #: [id number]   Prior Level of Function: Recreationally active   Comorbidities: OA   Medications: Verified on Patient Summary List   The Plan of Care and following information is based on the information from the initial evaluation.   ===========================================================================================  Assessment / key information: Patient is a 39 y.o. male presenting with CC chronic LBP x2 years, worsened over the past 3 months. He reports prior bouts of therapy with limited success as he never fully abolished his symptoms. Pain levels averaged 1/10 prior to recent exacerbation. When prompted re: change since the start of the year, he admits to \"getting lazy\" in performing his prior HEP that had helped in the past. Attempts to resume has been ineffective. Lumbar AROM WNL in all planes but extension. Flexion based movements reproduce and worsen condition, reproducing \"stabbing\" pain that radiates in his spine. He demonstrates centralization with MDT repeated movements, but has poor tolerance towards centralization effect. Pt  will benefit from physical therapist management to address his impairments (listed below),  educate him, and improve his level of function.  Thanks for your referral.   ===========================================================================================  Eval Complexity: History: MEDIUM  Complexity : 1-2 comorbidities / personal factors will impact the outcome/ POC Exam:HIGH Complexity : 4+ Standardized tests and measures addressing body structure, function, activity limitation and / or participation in recreation  Presentation: MEDIUM Complexity : Evolving with changing characteristics  Clinical Decision Making:MEDIUM Complexity : FOTO score of 26-74Overall Complexity:MEDIUM  Problem List: pain affecting function, decrease ROM, decrease strength, edema affecting function, impaired gait/ balance, decrease ADL/ functional abilitiies, decrease activity tolerance, decrease flexibility/ joint mobility and decrease transfer abilities  FOTO score: 35 indicating 64% functional disability  Treatment Plan may include any combination of the following: Therapeutic exercise, Therapeutic activities, Neuromuscular re-education, Physical agent/modality, Gait/balance training, Manual therapy, Patient education, Self Care training, Functional mobility training and Home safety training  Patient / Family readiness to learn indicated by: asking questions, trying to perform skills and interest  Persons(s) to be included in education: patient (P)  Barriers to Learning/Limitations: None  Measures taken: n/a   Patient Goal (s): \"Avoid Surgery\"   Patient self reported health status: Fair  Rehabilitation Potential: good   Short Term Goals: To be accomplished in  2-3  weeks:  1. Patient to be adherent to HEP to facilitate pain control with ADL's.  2. Patient to report minimal difficulty toiletting. 3. Patient to report no discomfort brushing his teeth.  Long Term Goals: To be accomplished in  4-6  weeks:  1. Patient to be Safe and Independent with HEP to self-manage/prevent symptoms after DC. 2. Patient to increase FS FOTO score to > 54 to indicate increased functional independence. 3. Patient to report > 50% improvement in sleep interrupted by LBP.   Frequency / Duration:   Patient to be seen  2  times per week for 4-6  weeks:  Patient / Caregiver education and instruction: activity modification and exercises. We reviewed our facility's Patient Personal Responsibilities (PPR) form, particularly in regards to compliance towards his appointment time, our attendance policy, and his home exercise program. Patient was informed of possible discharge for non-compliance to our attendance policy per PPR form. We also discussed his POC as deemed appropriate by the treating therapist and physician. Patient verbalized understanding that he must show objective and functional improvement in an appropriate time frame. Patient verbalized understanding that should progress or compliance be lacking, we will contact the referring physician for further consultation to address and attempt to establish alternate treatment strategies as necessary and/or possibly discharge. Therapist Signature: Elaine Washington \"BJ\" Gautam Lewis DPT, Cert. MDT, Cert. DN, Cert. SMT, Dip. Osteopractic Date: 3/93/6598   Certification Period: n/a Time: 4:42 PM   ===========================================================================================  I certify that the above Physical Therapy Services are being furnished while the patient is under my care. I agree with the treatment plan and certify that this therapy is necessary. Physician Signature:        Date:       Time:                                         Geremias Fairchild NP   Please sign and return to [employer] or you may fax the signed copy to [phone]. Thank you.

## 2021-04-28 DIAGNOSIS — M51.37 DDD (DEGENERATIVE DISC DISEASE), LUMBOSACRAL: ICD-10-CM

## 2021-04-28 DIAGNOSIS — M43.16 SPONDYLOLISTHESIS AT L4-L5 LEVEL: ICD-10-CM

## 2021-04-28 RX ORDER — BACLOFEN 10 MG/1
5-10 TABLET ORAL
Qty: 60 TAB | Refills: 1 | Status: SHIPPED | OUTPATIENT
Start: 2021-04-28 | End: 2021-06-25

## 2021-05-05 ENCOUNTER — HOSPITAL ENCOUNTER (OUTPATIENT)
Dept: PHYSICAL THERAPY | Age: 36
Discharge: HOME | End: 2021-05-05
Attending: NURSE PRACTITIONER
Payer: COMMERCIAL

## 2021-05-05 PROCEDURE — 97110 THERAPEUTIC EXERCISES: CPT

## 2021-05-05 PROCEDURE — 97014 ELECTRIC STIMULATION THERAPY: CPT

## 2021-05-05 NOTE — PROGRESS NOTES
PHYSICAL THERAPY - DAILY TREATMENT NOTE    Patient Name: Isaiah Solis        Date: 2021  : 1985   YES Patient  Verified  Visit #:   2     Insurance: Payor: Reji Shepherd / Plan: [facility] / Product Type: PPO /      In time: 7:55 Out time: 8:50   Total Treatment Time: 55     Medicare/BCBS Time Tracking (below)   Total Timed Codes (min):  55 1:1 Treatment Time:  40     TREATMENT AREA = DDD (degenerative disc disease), lumbosacral [M51.37]    SUBJECTIVE    Pain Level (on 0 to 10 scale):  3-4  / 10   Medication Changes/New allergies or changes in medical history, any new surgeries or procedures? NO    If yes, update Summary List   Subjective Functional Status/Changes:  []  No changes reported     \"I felt good the rest of the day, no pain at all and that was the best day I had in a year. But the next day it came back. \"          OBJECTIVE     Therapeutic Procedures:  Min Procedure Specifics + Rationale   n/a [x]  Patient Education (performed throughout session) [x] Review HEP    [] Progressed/Changed HEP based on:   [] proper performance and advancement of Therex/TA   [] reduction in pain level    [] increased functional capacity       [] change in directional preference   40 [x] Therapeutic Exercise   [x]  See Flowsheet   Rationale: increase ROM and increase strength to improve the patients ability to participate in ADL's      Modality rationale: decrease inflammation, decrease pain, increase tissue extensibility and increase muscle contraction/control to improve the patients ability to perform ADL's with greater ease     Min Type Additional Details   15 [x] E-Stim Type:Symmetrical Biphasic  Attended? NO Location: L/S  [] supine              [x] prone  [x] legs elevated   [] legs flat  [x]w/heat  []w/ice  [] sitting   [] Vasopneumatic Device    [x] Skin assessment post-treatment:  [x]intact [x]redness- no adverse reaction       []redness  adverse reaction:     Other Objective/Functional Measures:    Initiated new therex per flow sheet     Post Treatment Pain Level (on 0 to 10) scale:   1  / 10     ASSESSMENT    Assessment/Changes in Function:       Report of L/S sharp pain with Constanza Sit up with right LE extended, but transient. Patient will continue to benefit from skilled PT services to modify and progress therapeutic interventions, address functional mobility deficits, address ROM deficits, address strength deficits, analyze and address soft tissue restrictions, analyze and cue movement patterns, analyze and modify body mechanics/ergonomics and instruct in home and community integration  to attain remaining goals   Progress toward goals / Updated goals:    1st session since initial eval, no significant progress noted in return to function. PLAN    [x]  Upgrade activities as tolerated  [x]  Update interventions per flow sheet YES Continue plan of care   []  Discharge due to :    []  Other:      Therapist: Jose Enrique Paredes \"BJ\" Galen, RAJESH, Cert. MDT, Cert. DN, Cert. SMT, Dip.  Osteopractic    Date: 5/5/2021 Time: 7:59 AM     Future Appointments   Date Time Provider Yamil Dominguez   5/5/2021  8:00 AM Del Medellin, PT [facility]   5/7/2021  9:30 AM Domenico Johnson, PTA [facility]   5/11/2021  8:00 AM Del Medellin, PT [facility]   5/13/2021  8:00 AM Domenico Johnson, MARTIN [facility]   5/18/2021  8:00 AM Del Medellin, PT [facility]   5/20/2021  8:00 AM Domenico Johnson, PTA [facility]   5/25/2021  8:00 AM Del Medellin, PT [facility]   5/27/2021  8:00 AM Madonna Callejas, PTA [facility]

## 2021-05-07 ENCOUNTER — HOSPITAL ENCOUNTER (OUTPATIENT)
Dept: PHYSICAL THERAPY | Age: 36
Discharge: HOME | End: 2021-05-07
Attending: NURSE PRACTITIONER
Payer: COMMERCIAL

## 2021-05-07 PROCEDURE — 97110 THERAPEUTIC EXERCISES: CPT

## 2021-05-07 PROCEDURE — 97530 THERAPEUTIC ACTIVITIES: CPT

## 2021-05-07 PROCEDURE — 97014 ELECTRIC STIMULATION THERAPY: CPT

## 2021-05-07 NOTE — PROGRESS NOTES
PHYSICAL THERAPY - DAILY TREATMENT NOTE    Patient Name: Finn Marking        Date: 2021  : 1985   yes Patient  Verified  Visit #:   3   of   12  Insurance: Payor: Pola Barnard / Plan: [facility] / Product Type: PPO /      In time: 930 Out time: 1039   Total Treatment Time: 67     Medicare/BCBS Time Tracking (below)   Total Timed Codes (min):  52 1:1 Treatment Time:  52     TREATMENT AREA =  DDD (degenerative disc disease), lumbosacral [M51.37]    SUBJECTIVE  Pain Level (on 0 to 10 scale):  5  / 10   Medication Changes/New allergies or changes in medical history, any new surgeries or procedures?    no  If yes, update Summary List   Subjective Functional Status/Changes:  []  No changes reported     \"Its been 2 years since I could work out and run.  I just really want to get back to that\"       OBJECTIVE  Modalities Rationale:     decrease pain to improve patient's ability to safely perform ADLs, bending/stooping/ lifting; prolong sitting, standing and ambulation; and negotiate stairs with minimal to no pain and with min to no limitations  15 min [x] Estim, type/location: IFC to L/s in sitting                                      []  att     [x]  unatt     []  w/US     []  w/ice    [x]  w/heat    min []  Mechanical Traction: type/lbs                   []  pro   []  sup   []  int   []  cont    []  before manual    []  after manual    min []  Ultrasound, settings/location:      min []  Iontophoresis w/ dexamethasone, location:                                               []  take home patch       []  in clinic    min []  Ice     []  Heat    location/position: seated    min []  Vasopneumatic Device, press/temp:     min []  Other:    [x] Skin assessment post-treatment (if applicable):    [x]  intact    []  redness- no adverse reaction     []redness  adverse reaction:        42 min Therapeutic Exercise:  [x]  See flow sheet   Rationale:      increase ROM, increase strength and improve coordination to improve the patients ability to safely perform ADLs, bending/stooping/ lifting; prolong sitting, standing and ambulation; and negotiate stairs with minimal to no pain and with min to no limitations       10 min Therapeutic Activity: [x]  See flow sheet  postural/bed mobility training  reviewed lifting   Rationale:    increase ROM, increase strength and improve coordination to improve the patients ability to safely perform ADLs, bending/stooping/ lifting; prolong sitting, standing and ambulation; and negotiate stairs with minimal to no pain and with min to no limitations      Billed With/As:   [x] TE   [x] TA   [x] Neuro   [] Self Care Patient Education: [x] Review HEP    [] Progressed/Changed HEP based on:   [x] positioning   [x] body mechanics   [x] transfers   [x] heat/ice application    [x] other: Pt ed on importance and benefits of compliance with HEP, core strength/stability and proper posture; pt verbalized understanding       Other Objective/Functional Measures:    VCs + demo to perform proper technique for TE  Initiated TE per flowsheet  QUINTON>REIL NE  bird dog reduced pain to 0/10  maintained 0/10 until performed pallof press #3  performed 6 reps of bird dog and pain increased.  prone lying with pillow under hips reduced pain to 0/10     Reviewed proper bed mobility, sleeping positions, lifting techniques and importance and benefits of a neutral spine     Post Treatment Pain Level (on 0 to 10) scale:   0  / 10     ASSESSMENT  Assessment/Changes in Function:   demos poor posture; improved with VCs  demos proper bed mobility with Instruction     []  See Progress Note/Recertification   Patient will continue to benefit from skilled PT services to modify and progress therapeutic interventions, address functional mobility deficits, address ROM deficits, address strength deficits, analyze and address soft tissue restrictions, analyze and cue movement patterns, analyze and modify body mechanics/ergonomics, assess and modify postural abnormalities and instruct in home and community integration to attain remaining goals.    Progress toward goals / Updated goals:  Pt's first visit since IE, no noted progress        PLAN  [x]  Upgrade activities as tolerated yes Continue plan of care   []  Discharge due to :    []  Other:      Therapist: Lon Lundberg PTA    Date: 5/7/2021 Time: 11:57 AM     Future Appointments   Date Time Provider Yamil Dominguez   5/11/2021  8:00 AM Ele Echevarria, PT [facility]   5/13/2021  8:00 AM Harry Blackwell, MARTIN [facility]   5/18/2021  8:00 AM Ele Echevarria, PT [facility]   5/20/2021  8:00 AM Harry Blackwell, MARTIN [facility]   5/25/2021  8:00 AM Ele Echevarria, PT [facility]   5/27/2021  8:00 AM Madonna Callejas, MARTIN [facility]

## 2021-05-11 ENCOUNTER — HOSPITAL ENCOUNTER (OUTPATIENT)
Dept: PHYSICAL THERAPY | Age: 36
Discharge: HOME | End: 2021-05-11
Attending: NURSE PRACTITIONER
Payer: COMMERCIAL

## 2021-05-11 PROCEDURE — 97014 ELECTRIC STIMULATION THERAPY: CPT

## 2021-05-11 PROCEDURE — 97530 THERAPEUTIC ACTIVITIES: CPT

## 2021-05-11 PROCEDURE — 97110 THERAPEUTIC EXERCISES: CPT

## 2021-05-11 NOTE — PROGRESS NOTES
PHYSICAL THERAPY - DAILY TREATMENT NOTE    Patient Name: Carina Ford        Date: 2021  : 1985   YES Patient  Verified  Visit #:   4     Insurance: Payor: Makayla Earing / Plan: [employer] [facility] [employer] / Product Type: PPO /      In time: 7:55 Out time: 9:00   Total Treatment Time: 65     Medicare/BCBS Time Tracking (below)   Total Timed Codes (min):  65 1:1 Treatment Time:  46     TREATMENT AREA = DDD (degenerative disc disease), lumbosacral [M51.37]    SUBJECTIVE    Pain Level (on 0 to 10 scale):  1  / 10   Medication Changes/New allergies or changes in medical history, any new surgeries or procedures? NO    If yes, update Summary List   Subjective Functional Status/Changes:  []  No changes reported     \"It's been pretty good lately. Not much pain right now. \"          OBJECTIVE     Therapeutic Procedures:  Min Procedure Specifics + Rationale   n/a [x]  Patient Education (performed throughout session) [x] Review HEP    [] Progressed/Changed HEP based on:   [] proper performance and advancement of Therex/TA   [] reduction in pain level    [] increased functional capacity       [] change in directional preference   30(26) [x] Therapeutic Exercise   [x]  See Flowsheet   Rationale: increase ROM and increase strength to improve the patients ability to participate in ADL's    15 [x] Therapeutic Activity   [x]  See Flowsheet  Single 10lb Club  Pullover  [employer]ion Press  Pullover to [employer] to Press    Rationale: To improve safety, proprioception, coordination, and efficiency with tasks     Modality rationale: decrease inflammation, decrease pain, increase tissue extensibility and increase muscle contraction/control to improve the patients ability to perform ADL's with greater ease     Min Type Additional Details   15 [x] E-Stim Type:Symmetrical Biphasic  Attended? NO Location: L/S  [] supine              [] prone  [] legs elevated   [] legs flat  [x]w/heat  []w/ice  [x] sitting   [] Vasopneumatic Device    [x] Skin assessment post-treatment:  [x]intact [x]redness- no adverse reaction       []redness  adverse reaction:     Other Objective/Functional Measures: Added new therex per flow sheet     Post Treatment Pain Level (on 0 to 10) scale:   0  / 10     ASSESSMENT    Assessment/Changes in Function:       Derangement reducing as patient denied recurrence or worsening of symptoms with treatment progression         Patient will continue to benefit from skilled PT services to modify and progress therapeutic interventions, address functional mobility deficits, address ROM deficits, address strength deficits, analyze and address soft tissue restrictions, analyze and cue movement patterns, analyze and modify body mechanics/ergonomics and instruct in home and community integration  to attain remaining goals   Progress toward goals / Updated goals:    Progressing well in HEP compliance     PLAN    [x]  Upgrade activities as tolerated  [x]  Update interventions per flow sheet YES Continue plan of care   []  Discharge due to :    []  Other:      Therapist: Rosa Spivey \"BJ\" Mal Alex, DPT, Cert. MDT, Cert. DN, Cert. SMT, Dip.  Osteopractic    Date: 5/11/2021 Time: 8:02 AM     Future Appointments   Date Time Provider Yamil Dominguez   5/13/2021  8:00 AM Vick Hunter PTA [facility]   5/18/2021  8:00 AM Jhony Martinez PT [facility]   5/20/2021  8:00 AM Vick Hunter PTA [facility]   5/25/2021  8:00 AM Jhony Martinez PT [facility]   5/27/2021  8:00 AM Madonna Callejas, PTA [facility]

## 2021-05-13 ENCOUNTER — HOSPITAL ENCOUNTER (OUTPATIENT)
Dept: PHYSICAL THERAPY | Age: 36
Discharge: HOME | End: 2021-05-13
Attending: NURSE PRACTITIONER
Payer: COMMERCIAL

## 2021-05-13 PROCEDURE — 97014 ELECTRIC STIMULATION THERAPY: CPT

## 2021-05-13 PROCEDURE — 97530 THERAPEUTIC ACTIVITIES: CPT

## 2021-05-13 PROCEDURE — 97140 MANUAL THERAPY 1/> REGIONS: CPT

## 2021-05-13 PROCEDURE — 97110 THERAPEUTIC EXERCISES: CPT

## 2021-05-13 NOTE — PROGRESS NOTES
PHYSICAL THERAPY - DAILY TREATMENT NOTE    Patient Name: Blanca Graham        Date: 2021  : 1985   yes Patient  Verified  Visit #:     Insurance: Payor: Jason Rodriguez / Plan: [employer] [facility] [place] / Product Type: PPO /      In time: 800 Out time: 798   Total Treatment Time: 72     Medicare/BCBS Time Tracking (below)   Total Timed Codes (min):  57 1:1 Treatment Time:  57     TREATMENT AREA =  DDD (degenerative disc disease), lumbosacral [M51.37]    SUBJECTIVE  Pain Level (on 0 to 10 scale): 3  / 10   Medication Changes/New allergies or changes in medical history, any new surgeries or procedures?    no  If yes, update Summary List   Subjective Functional Status/Changes:  []  No changes reported     \"I really want this to get better. I am thinking I need a MRI.  I had some days where I didn't have any pain, but yesterday after I got up from walking after sitting it started to hurt\"       OBJECTIVE  Modalities Rationale:     decrease pain to improve patient's ability to safely perform ADLs, bending/stooping/ lifting; prolong sitting, standing and ambulation; and negotiate stairs with minimal to no pain and with min to no limitations  15 min [x] Estim, type/location: IFC to L/s in sitting                                      []  att     [x]  unatt     []  w/US     []  w/ice    [x]  w/heat    min []  Mechanical Traction: type/lbs                   []  pro   []  sup   []  int   []  cont    []  before manual    []  after manual    min []  Ultrasound, settings/location:      min []  Iontophoresis w/ dexamethasone, location:                                               []  take home patch       []  in clinic    min []  Ice     []  Heat    location/position: seated    min []  Vasopneumatic Device, press/temp:     min []  Other:    [x] Skin assessment post-treatment (if applicable):    [x]  intact    []  redness- no adverse reaction     []redness  adverse reaction:        34 min Therapeutic Exercise: [x]  See flow sheet   Rationale:      increase ROM, increase strength and improve coordination to improve the patients ability to safely perform ADLs, bending/stooping/ lifting; prolong sitting, standing and ambulation; and negotiate stairs with minimal to no pain and with min to no limitations     8 min Manual Therapy: MET to correct right anterior innominate  + shot gun    Rationale:      decrease pain, increase ROM, increase tissue extensibility, decrease trigger points and increase postural awareness to improve patient's ability to safely perform ADLs, bending/stooping/ lifting; prolong sitting, standing and ambulation; and negotiate stairs with minimal to no pain and with min to no limitations        15 min Therapeutic Activity: [x]  See flow sheet  postural training  Side stepping knees flexed/knees extended    Rationale:    increase ROM, increase strength and improve coordination to improve the patients ability to safely perform ADLs, bending/stooping/ lifting; prolong sitting, standing and ambulation; and negotiate stairs with minimal to no pain and with min to no limitations      Billed With/As:   [x] TE   [x] TA   [x] Neuro   [] Self Care Patient Education: [x] Review HEP    [] Progressed/Changed HEP based on:   [x] positioning   [x] body mechanics   [x] transfers   [x] heat/ice application    [x] other: Pt ed on importance and benefits of compliance with HEP, core strength/stability and proper posture; pt verbalized understanding       Other Objective/Functional Measures:  discussed proper chair for work and ergonomics to promote proper positioning and reduce pain      VCs + demo to perform proper technique for TE    Hip MMT strength left=5/5 grossly, right hip flex=5/5, abd/ext=4+/5/4-/5  knee flex/ext MMT B 5/5  initiated pallof #3 without c/o p!      Post Treatment Pain Level (on 0 to 10) scale:   1  / 10     ASSESSMENT  Assessment/Changes in Function:   pain with bed mobility, improved after MET to correct anterior right innominate      c/o right L/s pain with right hip ext  and increase hip rot compensation with RLE WBing performing bird dog    pain reduced to 1/10 after MET      []  See Progress Note/Recertification   Patient will continue to benefit from skilled PT services to modify and progress therapeutic interventions, address functional mobility deficits, address ROM deficits, address strength deficits, analyze and address soft tissue restrictions, analyze and cue movement patterns, analyze and modify body mechanics/ergonomics, assess and modify postural abnormalities and instruct in home and community integration to attain remaining goals. Progress toward goals / Updated goals: · Short Term Goals: To be accomplished in  2-3  weeks:  1. Patient to be adherent to HEP to facilitate pain control with ADL's. Established HEP  2. Patient to report minimal difficulty toiletting. progressing, intermittent  3. Patient to report no discomfort brushing his teeth. · Long Term Goals: To be accomplished in  4-6  weeks:  1. Patient to be Safe and Independent with HEP to self-manage/prevent symptoms after DC.   2. Patient to increase FS FOTO score to > 54 to indicate increased functional      PLAN  [x]  Upgrade activities as tolerated yes Continue plan of care   []  Discharge due to :    []  Other:      Therapist: Lon Lundberg PTA    Date: 5/13/2021 Time: 10:04 AM     Future Appointments   Date Time Provider Yamil Dominguez   5/18/2021  8:00 AM Ele Echevarria, PT [facility]   5/20/2021  8:00 AM Harry Blackwell PTA [facility]   5/25/2021  8:00 AM [facility]   5/27/2021  8:00 AM Henline, Terrell Kussmaul, [facility]

## 2021-05-18 ENCOUNTER — HOSPITAL ENCOUNTER (OUTPATIENT)
Dept: PHYSICAL THERAPY | Age: 36
Discharge: HOME | End: 2021-05-18
Attending: NURSE PRACTITIONER
Payer: COMMERCIAL

## 2021-05-18 PROCEDURE — 97014 ELECTRIC STIMULATION THERAPY: CPT

## 2021-05-18 PROCEDURE — 97110 THERAPEUTIC EXERCISES: CPT

## 2021-05-18 PROCEDURE — 97530 THERAPEUTIC ACTIVITIES: CPT

## 2021-05-18 NOTE — PROGRESS NOTES
PHYSICAL THERAPY - DAILY TREATMENT NOTE    Patient Name: Alirio Dow        Date: 2021  : 1985   YES Patient  Verified  Visit #:     Insurance: Payor: Rosi Ba / Plan: [facility] / Product Type: PPO /      In time: 8:00 Out time: 9:00   Total Treatment Time: 60     Medicare/BCBS Time Tracking (below)   Total Timed Codes (min):  60 1:1 Treatment Time:  45     TREATMENT AREA = DDD (degenerative disc disease), lumbosacral [M51.37]    SUBJECTIVE    Pain Level (on 0 to 10 scale):  3  / 10   Medication Changes/New allergies or changes in medical history, any new surgeries or procedures? NO    If yes, update Summary List   Subjective Functional Status/Changes:  []  No changes reported     \"Last night I had some excrutiating pain when I went to go to the bathroom, but when I went back to bed it was fine. I got up and it was better. Aside from that I pretty much had just about a 3/10 pain and I haven't really had the other shooting pain. Some days I have no pain though\"          OBJECTIVE     Therapeutic Procedures:  Min Procedure Specifics + Rationale   n/a [x]  Patient Education (performed throughout session) [x] Review HEP    [] Progressed/Changed HEP based on:   [] proper performance and advancement of Therex/TA   [] reduction in pain level    [] increased functional capacity       [] change in directional preference   30 [x] Therapeutic Exercise   [x]  See Flowsheet   Rationale: increase ROM and increase strength to improve the patients ability to participate in ADL's    15 [x] Therapeutic Activity   [x]  See Flowsheet  KB Bottom's Up OHP  KB Sit to Stand Bottom's Up OHP  Rationale:  To improve safety, proprioception, coordination, and efficiency with tasks     Modality rationale: decrease inflammation, decrease pain, increase tissue extensibility and increase muscle contraction/control to improve the patients ability to perform ADL's with greater ease     Min Type Additional Details   15 [x] E-Stim Type:Symmetrical Biphasic  Attended? NO Location: L/S  [] supine              [] prone  [] legs elevated   [] legs flat  [x]w/heat  []w/ice  [x] sitting   [] Vasopneumatic Device    [x] Skin assessment post-treatment:  [x]intact [x]redness- no adverse reaction       []redness  adverse reaction:     Other Objective/Functional Measures:    Increased reps/sets/resistance per flow sheet. Post Treatment Pain Level (on 0 to 10) scale:   1-2  / 10     ASSESSMENT    Assessment/Changes in Function:       Tolerated treatment well without complaints of progression, indicating improved functional capacity for ADL's. Patient will continue to benefit from skilled PT services to modify and progress therapeutic interventions, address functional mobility deficits, address ROM deficits, address strength deficits, analyze and address soft tissue restrictions, analyze and cue movement patterns, analyze and modify body mechanics/ergonomics and instruct in home and community integration  to attain remaining goals   Progress toward goals / Updated goals:    Progressing in functional strength. PLAN    [x]  Upgrade activities as tolerated  [x]  Update interventions per flow sheet YES Continue plan of care   []  Discharge due to :    []  Other:      Therapist: Boo Chavarria \"BJ\" RAJESH Aaron, Cert. MDT, Cert. DN, Cert. SMT, Dip.  Osteopractic    Date: 5/18/2021 Time: 8:09 AM     Future Appointments   Date Time Provider Yamil Dominguez   5/20/2021  8:00 AM Chela Ortega PTA [facility]   5/25/2021  8:00 AM Raisa Hess PT [facility]   5/27/2021  8:00 AM Tad Callejas PTA [facility]

## 2021-05-20 ENCOUNTER — HOSPITAL ENCOUNTER (OUTPATIENT)
Dept: PHYSICAL THERAPY | Age: 36
Discharge: HOME | End: 2021-05-20
Attending: NURSE PRACTITIONER
Payer: COMMERCIAL

## 2021-05-20 PROCEDURE — 97014 ELECTRIC STIMULATION THERAPY: CPT

## 2021-05-20 PROCEDURE — 97530 THERAPEUTIC ACTIVITIES: CPT

## 2021-05-20 PROCEDURE — 97110 THERAPEUTIC EXERCISES: CPT

## 2021-05-20 PROCEDURE — 97140 MANUAL THERAPY 1/> REGIONS: CPT

## 2021-05-20 NOTE — PROGRESS NOTES
PHYSICAL THERAPY - DAILY TREATMENT NOTE    Patient Name: Kamryn Ibarra        Date: 2021  : 1985   yes Patient  Verified  Visit #:     Insurance: Payor: BLUE CROSS / Plan: [facility] / Product Type: PPO /      In time: 5569 Out time: 48   Total Treatment Time: 71     Medicare/BCBS Time Tracking (below)   Total Timed Codes (min):  56 1:1 Treatment Time:  56     TREATMENT AREA =  DDD (degenerative disc disease), lumbosacral [M51.37]    SUBJECTIVE  Pain Level (on 0 to 10 scale): 2-3  / 10   Medication Changes/New allergies or changes in medical history, any new surgeries or procedures?    no  If yes, update Summary List   Subjective Functional Status/Changes:  []  No changes reported     \"2 days ok I got up to go to the bathroom and it was like a 9/10 across my back.  I dont understand\"       OBJECTIVE  Modalities Rationale:     decrease pain to improve patient's ability to safely perform ADLs, bending/stooping/ lifting; prolong sitting, standing and ambulation; and negotiate stairs with minimal to no pain and with min to no limitations  15 min [x] Estim, type/location: IFC to L/s in sitting                                      []  att     [x]  unatt     []  w/US     []  w/ice    [x]  w/heat    min []  Mechanical Traction: type/lbs                   []  pro   []  sup   []  int   []  cont    []  before manual    []  after manual    min []  Ultrasound, settings/location:      min []  Iontophoresis w/ dexamethasone, location:                                               []  take home patch       []  in clinic    min []  Ice     []  Heat    location/position: seated    min []  Vasopneumatic Device, press/temp:     min []  Other:    [x] Skin assessment post-treatment (if applicable):    [x]  intact    []  redness- no adverse reaction     []redness  adverse reaction:        21 min Therapeutic Exercise:  [x]  See flow sheet   Rationale:      increase ROM, increase strength and improve coordination to improve the patients ability to safely perform ADLs, bending/stooping/ lifting; prolong sitting, standing and ambulation; and negotiate stairs with minimal to no pain and with min to no limitations     15 min Manual Therapy: MET to correct right anterior innominate  + shot gun   FRAMS F1-F4 > IASTM static cupping to L/s natasha, lumbosacral junction  performed prone hip ext, glut kick butt burners and open book    Rationale:      decrease pain, increase ROM, increase tissue extensibility, decrease trigger points and increase postural awareness to improve patient's ability to safely perform ADLs, bending/stooping/ lifting; prolong sitting, standing and ambulation; and negotiate stairs with minimal to no pain and with min to no limitations        10 min Therapeutic Activity: [x]  See flow sheet  postural training  squat IR/ER   monsterwalk fwd/retro  Side stepping knees flexed/knees extended    Rationale:    increase ROM, increase strength and improve coordination to improve the patients ability to safely perform ADLs, bending/stooping/ lifting; prolong sitting, standing and ambulation; and negotiate stairs with minimal to no pain and with min to no limitations      Billed With/As:   [x] TE   [x] TA   [x] Neuro   [] Self Care Patient Education: [x] Review HEP    [] Progressed/Changed HEP based on:   [x] positioning   [x] body mechanics   [x] transfers   [x] heat/ice application    [x] other: Pt ed on importance and benefits of compliance with HEP, core strength/stability and proper posture; pt verbalized understanding       Other Objective/Functional Measures:  VCs + demo to perform proper technique for TE  attempted Retro amb on TM; p! in right knee after ~ 2 mins, held at this time    MET to correct right anterior innominate  initiated prone hip ext, open book with no c/o p! Post Treatment Pain Level (on 0 to 10) scale:   0  / 10     ASSESSMENT  Assessment/Changes in Function:   c/o L/s p! at 6 rep of bird dog, subsided after completed 10 reps  demos APT in squat position, able to correct with instruction      []  See Progress Note/Recertification   Patient will continue to benefit from skilled PT services to modify and progress therapeutic interventions, address functional mobility deficits, address ROM deficits, address strength deficits, analyze and address soft tissue restrictions, analyze and cue movement patterns, analyze and modify body mechanics/ergonomics, assess and modify postural abnormalities and instruct in home and community integration to attain remaining goals. Progress toward goals / Updated goals: · Short Term Goals: To be accomplished in  2-3  weeks:  1. Patient to be adherent to HEP to facilitate pain control with ADL's. slow progressing, min HEP  2. Patient to report minimal difficulty toiletting. progressing, intermittent  3. Patient to report no discomfort brushing his teeth. · Long Term Goals: To be accomplished in  4-6  weeks:  1. Patient to be Safe and Independent with HEP to self-manage/prevent symptoms after DC.   2. Patient to increase FS FOTO score to > 54 to indicate increased functional      PLAN  [x]  Upgrade activities as tolerated yes Continue plan of care   []  Discharge due to :    []  Other:      Therapist: Holly Zamora PTA    Date: 5/20/2021 Time: 1:08 PM     Future Appointments   Date Time Provider Yamil Dominguez   5/25/2021  8:00 AM [facility] [address]   5/27/2021  8:00 AM Mana Callejas PTA [facility] [address]

## 2021-05-25 ENCOUNTER — HOSPITAL ENCOUNTER (OUTPATIENT)
Dept: PHYSICAL THERAPY | Age: 36
Discharge: HOME | End: 2021-05-25
Attending: NURSE PRACTITIONER
Payer: COMMERCIAL

## 2021-05-25 PROCEDURE — 97014 ELECTRIC STIMULATION THERAPY: CPT

## 2021-05-25 PROCEDURE — 97110 THERAPEUTIC EXERCISES: CPT

## 2021-05-25 PROCEDURE — 97530 THERAPEUTIC ACTIVITIES: CPT

## 2021-05-25 NOTE — PROGRESS NOTES
PHYSICAL THERAPY - DAILY TREATMENT NOTE    Patient Name: Luiz Floyd        Date: 2021  : 1985   YES Patient  Verified  Visit #:   10     Insurance: Payor: Eriberto Mendoza / Plan: [address] [facility] [place] / Product Type: PPO /      In time: 8:00 am Out time: 8:50 am   Total Treatment Time: 50     TREATMENT AREA = DDD (degenerative disc disease), lumbosacral [M51.37]    SUBJECTIVE    Pain Level (on 0 to 10 scale):  3  / 10   Medication Changes/New allergies or changes in medical history, any new surgeries or procedures? NO    If yes, update Summary List   Subjective Functional Status/Changes:  []  No changes reported   \"Pain when I am lying down and standing in my Lower back. Only thing that helps in sitting in a chair. \"         OBJECTIVE    Therapeutic Procedures:  Min Procedure Specifics + Rationale   n/a [x]  Patient Education (performed throughout session) [x] Review HEP    [] Progressed/Changed HEP based on:   [] proper performance and advancement of Therex/TA   [] reduction in pain level    [] increased functional capacity       [] change in directional preference   15 [x] Therapeutic Exercise   [x]  See Flowsheet   Rationale: increase ROM and increase strength to improve the patients ability to participate in ADL's    15 [x] Therapeutic Activity   [x]  See Flowsheet  Monster walk (fwd/return, lateral knee flex/ext)  Rationale: To improve safety, proprioception, coordination, and efficiency with tasks     Modality rationale: decrease inflammation, decrease pain, increase tissue extensibility and increase muscle contraction/control to improve the patients ability to perform ADL's with greater ease     Min Type Additional Details   20 [x] E-Stim Type:IFC   Attended? NO Location: L/S   [] supine              [] prone  [] legs elevated   [] legs flat  [x]w/heat  []w/ice  [x] sitting   [] Vasopneumatic Device    [x] Skin assessment post-treatment:  [x]intact [x]redness- no adverse reaction       []redness  adverse reaction:     Other Objective/Functional Measures:  Increase in P! With Bird dogs  Pt performed extended elbow planks with minimal increase in P! 10x5 sec hold  Pt educated on referred pain   Pt was asked re: his perceived progress and improvement in therapy. He declined any significant improvement. Pt asked about HEP in relation to pain. He reports \"The only thing that really helps is me sitting down\". Post Treatment Pain Level (on 0 to 10) scale:   2  / 10     ASSESSMENT    Assessment/Changes in Function:     Pt reports pain in sitting/prone position that is relieved by sitting   Pt has increase in pain during bird dog at >5 reps . Regressed to planks with extended elbows with minimal increase in pain from intake pain level. Patient will continue to benefit from skilled PT services to modify and progress therapeutic interventions, address functional mobility deficits, address ROM deficits, address strength deficits, analyze and address soft tissue restrictions, analyze and cue movement patterns, analyze and modify body mechanics/ergonomics, assess and modify postural abnormalities and instruct in home and community integration  to attain remaining goals   Progress toward goals / Updated goals: Will re-assess next visit. PLAN    [x]  Upgrade activities as tolerated  [x]  Update interventions per flow sheet YES Continue plan of care   []  Discharge due to :    []  Other: Subjective report on lack of progress is likely due to lack of consistency and compliance towards HEP provided. Due to self limiting behavior in HEP compliance and lack of belief in therapy, will likely DC     Therapist: DELMI Bergman \"BJ\" Albert Juárez DPT, Cert. MDT, Cert. DN, Cert. SMT, Dip.  Osteopractic    Date: 5/25/2021 Time: 8:05 AM     Future Appointments   Date Time Provider Yamil Dominguez   5/27/2021  8:00 AM Yamil Hancock PTA [facility]

## 2021-05-27 ENCOUNTER — HOSPITAL ENCOUNTER (OUTPATIENT)
Dept: PHYSICAL THERAPY | Age: 36
Discharge: HOME | End: 2021-05-27
Attending: NURSE PRACTITIONER
Payer: COMMERCIAL

## 2021-05-27 PROCEDURE — 97530 THERAPEUTIC ACTIVITIES: CPT

## 2021-05-27 PROCEDURE — 97140 MANUAL THERAPY 1/> REGIONS: CPT

## 2021-05-27 PROCEDURE — 97014 ELECTRIC STIMULATION THERAPY: CPT

## 2021-05-27 PROCEDURE — 97110 THERAPEUTIC EXERCISES: CPT

## 2021-05-27 NOTE — PROGRESS NOTES
PHYSICAL THERAPY - DAILY TREATMENT NOTE    Patient Name: Adrian Cates        Date: 2021  : 1985   yes Patient  Verified  Visit #:     Insurance: Payor: BLUE CROSS / Plan: [employer] [facility] [place] / Product Type: PPO /      In time: 811 Out time: 925   Total Treatment Time: 74     Medicare/BCBS Time Tracking (below)   Total Timed Codes (min):  59 1:1 Treatment Time:  59     TREATMENT AREA =  DDD (degenerative disc disease), lumbosacral [M51.37]    SUBJECTIVE  Pain Level (on 0 to 10 scale): 3- 10   Medication Changes/New allergies or changes in medical history, any new surgeries or procedures?    no  If yes, update Summary List   Subjective Functional Status/Changes:  []  No changes reported     \"I really do think therapy is good for me. I just dont understand why I cant brush my teeth without pain. it has not gotten better. yesterday I bent over properly to use the sprinkler and when I went to get back up I was like an 8/10. I did the bird dog and I was able to get up\"   Pt reports the pain doesn't wake him up at night, he just gets pain when he turns over in bed. He said overall he has more good days than bad but only sees min improvement overall with brushing teeth and bending.  Pt reports he would like to get an MRI       OBJECTIVE  Modalities Rationale:     decrease pain to improve patient's ability to safely perform ADLs, bending/stooping/ lifting; prolong sitting, standing and ambulation; and negotiate stairs with minimal to no pain and with min to no limitations  15 min [x] Estim, type/location: IFC to L/s in sitting                                      []  att     [x]  unatt     []  w/US     []  w/ice    [x]  w/heat    min []  Mechanical Traction: type/lbs                   []  pro   []  sup   []  int   []  cont    []  before manual    []  after manual    min []  Ultrasound, settings/location:      min []  Iontophoresis w/ dexamethasone, location: []  take home patch       []  in clinic    min []  Ice     []  Heat    location/position: seated    min []  Vasopneumatic Device, press/temp:     min []  Other:    [x] Skin assessment post-treatment (if applicable):    [x]  intact    []  redness- no adverse reaction     []redness  adverse reaction:        39 min Therapeutic Exercise:  [x]  See flow sheet   Rationale:      increase ROM, increase strength and improve coordination to improve the patients ability to safely perform ADLs, bending/stooping/ lifting; prolong sitting, standing and ambulation; and negotiate stairs with minimal to no pain and with min to no limitations     10 min Manual Therapy: MET to correct right anterior innominate  + shot gun   OP to L/s for ext mobs   Rationale:      decrease pain, increase ROM, increase tissue extensibility, decrease trigger points and increase postural awareness to improve patient's ability to safely perform ADLs, bending/stooping/ lifting; prolong sitting, standing and ambulation; and negotiate stairs with minimal to no pain and with min to no limitations        10 min Therapeutic Activity: [x]  See flow sheet  postural training  squat IR/ER   kbterwalk fwd/retro  Side stepping knees flexed/knees extended    Rationale:    increase ROM, increase strength and improve coordination to improve the patients ability to safely perform ADLs, bending/stooping/ lifting; prolong sitting, standing and ambulation; and negotiate stairs with minimal to no pain and with min to no limitations      Billed With/As:   [x] TE   [x] TA   [x] Neuro   [] Self Care Patient Education: [x] Review HEP    [] Progressed/Changed HEP based on:   [x] positioning   [x] body mechanics   [x] transfers   [x] heat/ice application    [x] other: Pt ed on importance and benefits of compliance with HEP, core strength/stability and proper posture; pt verbalized understanding       Other Objective/Functional Measures:  VCs + demo to perform proper technique for TE  MET to correct right anterior innominate  c/o p! with log roll, no pain with supine to sit   able to perform all bed TE without pain  therapist OP to L/s decrease pain     performed open book at wall with no difficulty     See PN    continues to require VCs to correct PPT with squatting    Post Treatment Pain Level (on 0 to 10) scale:   0  / 10     ASSESSMENT  Assessment/Changes in Function:   See PN     []  See Progress Note/Recertification   Patient will continue to benefit from skilled PT services to modify and progress therapeutic interventions, address functional mobility deficits, address ROM deficits, address strength deficits, analyze and address soft tissue restrictions, analyze and cue movement patterns, analyze and modify body mechanics/ergonomics, assess and modify postural abnormalities and instruct in home and community integration to attain remaining goals. Progress toward goals / Updated goals:  See PN       PLAN  [x]  Upgrade activities as tolerated yes Continue plan of care   []  Discharge due to :    [x]  Other: Pt will benefit from further skilled PT services 2x wk x 4 wks to increase strength/stability and decrease sxs to promote functional mobility. Therapist: Chato Chan PTA    Date: 5/27/2021 Time: 12:36 PM     No future appointments.

## 2021-05-27 NOTE — PROGRESS NOTES
[address] [facility] PHYSICAL THERAPY  [address] [facility] [place] - Phone: [phone]  Fax: [phone]  PROGRESS NOTE  Patient Name: Collin Oneill : 1985   Treatment/Medical Diagnosis: DDD (degenerative disc disease), lumbosacral [M51.37]   Referral Source: Alvino Mackay NP Provider #  [id number]   Date of Initial Visit: 21 Attended Visits: 9 Missed Visits: 0     SUMMARY OF TREATMENT  Patient's POC has consisted of therex, therapeutic activities, manual therapy prn, modalities prn, pt. education, and a comprehensive HEP. Treatment strategies used to address functional mobility deficits, ROM deficits, strength deficits, analyze and address soft tissue restrictions, analyze and cue movement patterns, analyze and modify body mechanics/ergonomics, assess and modify postural abnormalities and instruct in home and community integration. CURRENT STATUS  Anaheim Bandy is making steady progress towards goals in PT for LBP as evidence by  progressing towards (I) with HEP, decrease pain and improving functional mobility. Pt reports moderate compliance with HEP, and is able to perform most TE in clinic without c/o p!. Pt with intermittent L/s sxs with bending and performing bed mobility. Pt reports max p! 8/10, min p! 0/10. Pt is able to manage sxs with MET to correct right anterior innominate  Pt is limited in prolong amb and with bending with proper mechanics. Pt with no sleep interruptions but has intermittent pain with bed mobility, elevated in the morning at times. Pt with decrease core strength during TE, and demos 5/5 grossly left hip strength, right hip strength flex=5/5, abd=4+/5, ext=4-/5, and knee flex/ext MMT B 5/5. Pt increased FOTO from 35 to 58;  indicating improved functional mobility  Due to chronic intermittent LBP sxs Gatesjulio Reddingty would benefit from diagnostic imagining to rule out potential underlying pathologies.   Goal/Measure of Progress Goal Met? 1. Patient to be Safe and Independent with HEP to self-manage/prevent symptoms after DC. Status at last Eval: Established HEP Current Status: compliance with HEP progressing   2. Patient to increase FS FOTO score to > 54 to indicate increased functional independence. Status at last Eval: 35 Current Status: 62 yes   3. Patient to report > 50% improvement in sleep interrupted by LBP   Status at last Eval: pain Current Status: no interruptions yes     New Goals to be achieved in __4__  weeks:  1. Patient to be Safe and Independent with HEP to self-manage/prevent symptoms after DC  2. Pt to have no pain with brushing teeth to return to PLOF  3. Pt demo proper lifting mechanics with no c/o p! RECOMMENDATIONS  Due to chronic intermittent LBP sxs Teresa Uday would benefit from diagnostic imagining to rule out potential underlying pathologies. Pt will benefit from further skilled PT services 2x wk x 4 wks to increase strength/stability and decrease sxs to promote functional mobility. If you have any questions/comments please contact us directly at [phone]. Thank you for allowing us to assist in the care of your patient. LPTA Signature: Melisa Killian PTA  Date: 5/27/2021   PT Signature: Juan Tovar \"BJ\" Artemio Bautista, DPT, Cert. MDT, Cert. DN, Cert. SMT, Dip. Osteopractic Time: 9:19 AM   NOTE TO PHYSICIAN:  PLEASE COMPLETE THE ORDERS BELOW AND FAX TO   [facility] Physical Therapy: [phone]. If you are unable to process this request in 24 hours please contact our office: [phone].    ___ I have read the above report and request that my patient continue as recommended.   ___ I have read the above report and request that my patient continue therapy with the following changes/special instructions:_________________________________________________________   ___ I have read the above report and request that my patient be discharged from therapy.      Physician Signature:        Date:       Time: Elvira Blackmon, NP

## 2021-06-02 ENCOUNTER — OFFICE VISIT (OUTPATIENT)
Dept: ORTHOPEDIC SURGERY | Age: 36
End: 2021-06-02
Payer: COMMERCIAL

## 2021-06-02 ENCOUNTER — HOSPITAL ENCOUNTER (OUTPATIENT)
Dept: PHYSICAL THERAPY | Age: 36
Discharge: HOME | End: 2021-06-02
Attending: NURSE PRACTITIONER
Payer: COMMERCIAL

## 2021-06-02 VITALS
WEIGHT: 212 LBS | TEMPERATURE: 97.2 F | BODY MASS INDEX: 27.21 KG/M2 | RESPIRATION RATE: 16 BRPM | OXYGEN SATURATION: 100 % | HEIGHT: 74 IN | HEART RATE: 63 BPM

## 2021-06-02 DIAGNOSIS — M51.36 DEGENERATION, INTERVERTEBRAL DISC, LUMBAR: ICD-10-CM

## 2021-06-02 DIAGNOSIS — M43.16 SPONDYLOLISTHESIS AT L4-L5 LEVEL: ICD-10-CM

## 2021-06-02 DIAGNOSIS — M51.37 DDD (DEGENERATIVE DISC DISEASE), LUMBOSACRAL: Primary | ICD-10-CM

## 2021-06-02 PROCEDURE — 97530 THERAPEUTIC ACTIVITIES: CPT

## 2021-06-02 PROCEDURE — 97140 MANUAL THERAPY 1/> REGIONS: CPT

## 2021-06-02 PROCEDURE — 99213 OFFICE O/P EST LOW 20 MIN: CPT | Performed by: NURSE PRACTITIONER

## 2021-06-02 PROCEDURE — 97110 THERAPEUTIC EXERCISES: CPT

## 2021-06-02 PROCEDURE — 97014 ELECTRIC STIMULATION THERAPY: CPT

## 2021-06-02 RX ORDER — CEPHALEXIN 500 MG/1
500 CAPSULE ORAL 2 TIMES DAILY
COMMUNITY
Start: 2021-06-01 | End: 2021-06-08

## 2021-06-02 NOTE — PROGRESS NOTES
[address]  [address]  [place], [address] [place] [address] Street  Phone: [phone]  Fax: [phone]    Link Guillermo  : 1985  PCP: Wynell Holter, MD    PROGRESS NOTE    HISTORY OF PRESENT ILLNESS:  Chief Complaint   Patient presents with    Back Pain     follow up low back     Blanca Graham is a 39 y.o.  male with history of significant disc degeneration at L5-S1. No pars defect appreciated on x-ray. Cymbalta was discussed and he was given a trial of baclofen. A LMRI was discussed. He was started on a HEP and was told to avoid repetitive BLT. He was advised to avoid processed foods; rather eat fresh fruits, vegetables and meats with whole grains to reduce inflammation. Today, he states he got a piece of metal stuck in his left lower quadrant last night. He went to the ER. He has a SC on Monday with a general surgeon to discuss removing it. Denies bladder/bowel dysfunction, saddle paresthesia, weakness, gait disturbance, or other neurological deficit. Pt at this time desires to  continue with current care/proceed with medication evaluation/ MRI eval.      Duration of pain: , has had episodic low back pain since adolescence. Does pain radiate into extremities: RLE rarely  Does patient have numbness/tingling: no  Does patient have weakness: no   Pt denies saddle paresthesias. Denies persistent fevers, chills, weight changes, neurogenic bowel or bladder symptoms.      Treatments patient has tried:  Physical therapy:Yes  no benefit  OMT: w/ Dr. Johanny Ferrara with benefit  Doing HEP: Unknown  Beneficial medications: Motrin 800 mg (taking but knows he shouldn't)  Failed medications: Unable to take NSAIDs - stomach issues  Spinal injections: No     Spinal surgery- No.   Spine surgery consult: No.      L XR AP/lat/flex/ext/ob 6V 3/31/2021 (I personally reviewed these images): Disc space narrowing L5-S1 with anterior osteophytes.   Trace retrolisthesis at L4-5. No pars defect is appreciated  Head MRI 2016: normal (done for numbness of R hand and foot)     PMHx of ACL repair, HLD, urinary frequency, reflux, chronic tension headache. Works as a [employer] . 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 ASSESSMENT  39 y.o. male with back pain. Diagnoses and all orders for this visit:    1. DDD (degenerative disc disease), lumbosacral    2. Spondylolisthesis at L4-L5 level    3. Degeneration, intervertebral disc, lumbar         IMPRESSION/PLAN     1) Pt was given information on back exercises. 2) Would like a LMRI IF the metal is removed. If not, CT myelogram. He will call and update me after his SC.  3) he has failed conservative treatment including medications, HEP. He would like to consider injection. 4) Mr. Farzaneh Reyez has a reminder for a \"due or due soon\" health maintenance. I have asked that he contact his primary care provider, Josue Terry MD, for follow-up on this health maintenance. 5) We have informed patient to notify us for immediate appointment if he has any worsening neurogical symptoms or if an emergency situation presents, then call 911  6) Pt will follow-up in via phone. Will order appropriate imaging. Risks and benefits of ongoing  therapy have been reviewed with the patient.  is appropriate. No pain behaviors. Denies thoughts of harming self or others. Pt has a good risk to benefit ratio which allows the pt to function in a home environment without side effects. PAST MEDICAL HISTORY  Past Medical History:   Diagnosis Date    Arthritis     Decreased urine stream     Dyslipidemia     Erythema multiforme     Heartburn     High cholesterol     Nocturia     Urinary frequency     Urinary urgency     Weak urinary stream         MEDICATIONS  Current Outpatient Medications   Medication Sig Dispense Refill    cephALEXin (KEFLEX) 500 mg capsule Take 500 mg by mouth two (2) times a day.       baclofen (LIORESAL) 10 mg tablet TAKE 0.5-1 TABS BY MOUTH TWO (2) TIMES DAILY AS NEEDED FOR MUSCLE SPASM(S) OR PAIN 60 Tab 1    ibuprofen (MOTRIN) 800 mg tablet Take 1 Tab by mouth every eight (8) hours as needed for Pain for up to 90 days. 270 Tab 0    omalizumab (XOLAIR) 150 mg solr 300 mg by SubCUTAneous route every thirty (30) days.  XOLAIR 150 mg solr          ALLERGIES  No Known Allergies    SOCIAL HISTORY    Social History     Socioeconomic History    Marital status: SINGLE     Spouse name: Not on file    Number of children: Not on file    Years of education: Not on file    Highest education level: Not on file   Occupational History    Not on file   Tobacco Use    Smoking status: Never Smoker    Smokeless tobacco: Never Used   Vaping Use    Vaping Use: Never used   Substance and Sexual Activity    Alcohol use: Yes     Alcohol/week: 1.0 standard drinks     Types: 1 Cans of beer per week     Comment: occasionally    Drug use: No    Sexual activity: Not on file   Other Topics Concern    Not on file   Social History Narrative    ** Merged History Encounter **          Social Determinants of Health     Financial Resource Strain:     Difficulty of Paying Living Expenses:    Food Insecurity:     Worried About Running Out of Food in the Last Year:     Ran Out of Food in the Last Year:    Transportation Needs:     Lack of Transportation (Medical):      Lack of Transportation (Non-Medical):    Physical Activity:     Days of Exercise per Week:     Minutes of Exercise per Session:    Stress:     Feeling of Stress :    Social Connections:     Frequency of Communication with Friends and Family:     Frequency of Social Gatherings with Friends and Family:     Attends [employer] Services:     Active Member of Clubs or Organizations:     Attends Club or Organization Meetings:     Marital Status:    Intimate Partner Violence:     Fear of Current or Ex-Partner:     Emotionally Abused:     Physically Abused:     Sexually Abused:        SUBJECTIVE        Pain Scale: 3/10    Pain Assessment  6/2/2021   Location of Pain Back   Location Modifiers -   Severity of Pain 3   Quality of Pain Sharp   Quality of Pain Comment -   Duration of Pain Persistent   Frequency of Pain Constant   Aggravating Factors Other (Comment); Standing   Aggravating Factors Comment getting up from sitting to standing   Limiting Behavior Some   Relieving Factors Other (Comment)   Relieving Factors Comment changing position   Result of Injury -       Accompanied by self. REVIEW OF SYSTEMS  ROS    Constitutional: Negative for fever, chills, or weight change. Respiratory: Negative for cough or shortness of breath. Cardiovascular: Negative for chest pain or palpitations. Gastrointestinal: Negative for acid reflux, change in bowel habits, or constipation. Genitourinary: Negative for incontinence, dysuria and flank pain. Musculoskeletal: Positive for back pain. Skin: Negative for rash. Neurological: Negative for headaches, dizziness, or numbness. Endo/Heme/Allergies: Negative . Psychiatric/Behavioral: Negative. PHYSICAL EXAMINATION  Visit Vitals  Pulse 63   Temp 97.2 °F (36.2 °C) (Tympanic)   Resp 16   Ht 6' 2\" (1.88 m)   Wt 212 lb (96.2 kg)   SpO2 100%   BMI 27.22 kg/m²       Constitutional: Well developed,  well nourished,  awake, alert, and in no acute distress. Neurological:  Sensation to light touch is intact. Psychiatric: Affect and mood are appropriate. Integumentary: No rashes or abrasions noted on exposed areas,  warm, dry and intact. Cardiovascular/Peripheral Vascular:  No peripheral edema is noted. Lymphatic:  No evidence of lymphedema. No cervical lymphadenopathy. SPINE/MUSCULOSKELETAL EXAM    Lumbar spine:  No rash, ecchymosis, or gross obliquity. No fasciculations. No focal atrophy is noted. Range of motion is intact. Tenderness to palpation to low back. SI joints non-tender. Trochanters non tender.       Musculoskeletal:  No pain with extension, axial loading, or forward flexion. No pain with internal or external rotation of his hips. MOTOR     Hip Flex  Quads Hamstrings Ankle DF EHL Ankle PF   Right +4/5 +4/5 +4/5 +4/5 +4/5 +4/5   Left +4/5 +4/5 +4/5 +4/5 +4/5 +4/5     Straight Leg raise - bialterally. normal gait and station    Ambulation without assistive device. full weight bearing, non-antalgic gait.     Celeste Gallardo, NP

## 2021-06-02 NOTE — PROGRESS NOTES
PHYSICAL THERAPY - DAILY TREATMENT NOTE    Patient Name: Finn Marking        Date: 2021  : 1985   yes Patient  Verified  Visit #:   10 of   21  Insurance: Payor: Pola Barnard / Plan: [employer] [facility] / Product Type: PPO /      In time: 808 Out time: 920   Total Treatment Time: 72     Medicare/BCBS Time Tracking (below)   Total Timed Codes (min):  57 1:1 Treatment Time:  57     TREATMENT AREA =  DDD (degenerative disc disease), lumbosacral [M51.37]    SUBJECTIVE  Pain Level (on 0 to 10 scale): 2  / 10   Medication Changes/New allergies or changes in medical history, any new surgeries or procedures?    no  If yes, update Summary List   Subjective Functional Status/Changes:  []  No changes reported     \"I felt good this morning.  I felt better over the weekend\"       OBJECTIVE  Modalities Rationale:     decrease pain to improve patient's ability to safely perform ADLs, bending/stooping/ lifting; prolong sitting, standing and ambulation; and negotiate stairs with minimal to no pain and with min to no limitations  15 min [x] Estim, type/location: IFC to L/s in sitting                                      []  att     [x]  unatt     []  w/US     []  w/ice    [x]  w/heat    min []  Mechanical Traction: type/lbs                   []  pro   []  sup   []  int   []  cont    []  before manual    []  after manual    min []  Ultrasound, settings/location:      min []  Iontophoresis w/ dexamethasone, location:                                               []  take home patch       []  in clinic    min []  Ice     []  Heat    location/position: seated    min []  Vasopneumatic Device, press/temp:     min []  Other:    [x] Skin assessment post-treatment (if applicable):    [x]  intact    []  redness- no adverse reaction     []redness  adverse reaction:        34 min Therapeutic Exercise:  [x]  See flow sheet   Rationale:      increase ROM, increase strength and improve coordination to improve the patients ability to safely perform ADLs, bending/stooping/ lifting; prolong sitting, standing and ambulation; and negotiate stairs with minimal to no pain and with min to no limitations     8 min Manual Therapy: MET to correct right anterior innominate  + shot gun    Rationale:      decrease pain, increase ROM, increase tissue extensibility, decrease trigger points and increase postural awareness to improve patient's ability to safely perform ADLs, bending/stooping/ lifting; prolong sitting, standing and ambulation; and negotiate stairs with minimal to no pain and with min to no limitations        15 min Therapeutic Activity: [x]  See flow sheet  postural training  wall squat with SB  hip hinge to promote neutral spine for bending/lifting/transfers  RDLs with dowel   Rationale:    increase ROM, increase strength and improve coordination to improve the patients ability to safely perform ADLs, bending/stooping/ lifting; prolong sitting, standing and ambulation; and negotiate stairs with minimal to no pain and with min to no limitations      Billed With/As:   [x] TE   [x] TA   [] Neuro   [] Self Care Patient Education: [x] Review HEP    [] Progressed/Changed HEP based on:   [x] positioning   [x] body mechanics   [x] transfers   [x] heat/ice application    [x] other: Pt ed on importance and benefits of compliance with HEP, core strength/stability and proper posture; pt verbalized understanding       Other Objective/Functional Measures:  VCs + demo to perform proper technique for TE  initiated SB squats, and OOV TE without c/o P!  OOV: BKFO with GTB with UE raised; dead bug with UE, butt burners, and SLR   added AE to hip abd/ext with OTB   Post Treatment Pain Level (on 0 to 10) scale:   1  / 10     ASSESSMENT  Assessment/Changes in Function:   Progressed TE without c/o increase p!   VCs to decrease both knee anterior translation with squats  1 UE assist with hip ext/abd on AE without LOB   []  See Progress Note/Recertification Patient will continue to benefit from skilled PT services to modify and progress therapeutic interventions, address functional mobility deficits, address ROM deficits, address strength deficits, analyze and address soft tissue restrictions, analyze and cue movement patterns, analyze and modify body mechanics/ergonomics, assess and modify postural abnormalities and instruct in home and community integration to attain remaining goals.    Progress toward goals / Updated goals:  See PN last visit, no changes noted at this time     PLAN  [x]  Upgrade activities as tolerated yes Continue plan of care   []  Discharge due to :    []  Other:      Therapist: Chato Chan PTA    Date: 6/2/2021 Time: 11:35 PM     Future Appointments   Date Time Provider Yamil Dominguez   6/2/2021 10:20 AM Irvin Yoon NP [facility]   6/7/2021  2:45 PM Evelin Hernandez PTA [facility]   6/14/2021  2:45 PM Evelin Hernandez PTA [facility]   6/21/2021  2:45 PM Madonna Callejas PTA [facility]

## 2021-06-04 ENCOUNTER — APPOINTMENT (OUTPATIENT)
Dept: PHYSICAL THERAPY | Age: 36
End: 2021-06-04
Attending: NURSE PRACTITIONER
Payer: COMMERCIAL

## 2021-06-07 ENCOUNTER — HOSPITAL ENCOUNTER (OUTPATIENT)
Dept: PHYSICAL THERAPY | Age: 36
Discharge: HOME | End: 2021-06-07
Attending: NURSE PRACTITIONER
Payer: COMMERCIAL

## 2021-06-07 ENCOUNTER — TELEPHONE (OUTPATIENT)
Dept: ORTHOPEDIC SURGERY | Age: 36
End: 2021-06-07

## 2021-06-07 DIAGNOSIS — M43.16 SPONDYLOLISTHESIS AT L4-L5 LEVEL: ICD-10-CM

## 2021-06-07 DIAGNOSIS — M51.37 DDD (DEGENERATIVE DISC DISEASE), LUMBOSACRAL: Primary | ICD-10-CM

## 2021-06-07 PROCEDURE — 97530 THERAPEUTIC ACTIVITIES: CPT

## 2021-06-07 PROCEDURE — 97110 THERAPEUTIC EXERCISES: CPT

## 2021-06-07 PROCEDURE — 97140 MANUAL THERAPY 1/> REGIONS: CPT

## 2021-06-07 NOTE — TELEPHONE ENCOUNTER
Per ROSALIND Holm's OVN on 06/02/2021:    \"2) Would like a LMRI IF the metal is removed. If not, CT myelogram. He will call and update me after his SC. \"    Returned call to patient, Reached voicemail identified as \"Sanchez Bledsoe\", left message, identified myself/facility/callback number, informed of ROSALIND Holm being out of office today will return tomorrow, requested return call to facility with the name of the Surgeon, as we will need letter of clearance. Please review/advise.

## 2021-06-07 NOTE — TELEPHONE ENCOUNTER
Patient returned call. Surgeon: Dr. Carolina Valerio  Phone: [phone].     Patient can be reached at [phone]

## 2021-06-07 NOTE — TELEPHONE ENCOUNTER
Called and spoke with Imani Maldonado at Dr. Trav Rockwell office, per Imani Maldonado patient was seen today by Dr. Daniella Andrea. Per Imani Maldonado she will fax over today's office visit notes that indicate patient may have MRI. Provided Imani Maldonado with fax number to NS #2 @ [place] location.

## 2021-06-07 NOTE — PROGRESS NOTES
PHYSICAL THERAPY - DAILY TREATMENT NOTE    Patient Name: Abbi Lamas        Date: 2021  : 1985   yes Patient  Verified  Visit #:     Insurance: Payor: Alexander Medina / Plan: Robson Burch / Product Type: PPO /      In time: 254 Out time: 405   Total Treatment Time: 71     Medicare/[facility] Time Tracking (below)   Total Timed Codes (min):  71 1:1 Treatment Time:  60     TREATMENT AREA =  DDD (degenerative disc disease), lumbosacral [M51.37]    SUBJECTIVE  Pain Level (on 0 to 10 scale): 1/ 10   Medication Changes/New allergies or changes in medical history, any new surgeries or procedures?    no  If yes, update Summary List   Subjective Functional Status/Changes:  []  No changes reported     \"I felt it this weekend after I mowed the lawn, and did some things in the garage. I feel fine today.  I feel it the worse in bed\"       OBJECTIVE  Modalities Rationale:     decrease pain to improve patient's ability to safely perform ADLs, bending/stooping/ lifting; prolong sitting, standing and ambulation; and negotiate stairs with minimal to no pain and with min to no limitations  15 min [x] Estim, type/location: IFC to L/s in sitting                                      []  att     [x]  unatt     []  w/US     []  w/ice    [x]  w/heat    min []  Mechanical Traction: type/lbs                   []  pro   []  sup   []  int   []  cont    []  before manual    []  after manual    min []  Ultrasound, settings/location:      min []  Iontophoresis w/ dexamethasone, location:                                               []  take home patch       []  in clinic    min []  Ice     []  Heat    location/position: seated    min []  Vasopneumatic Device, press/temp:     min []  Other:    [x] Skin assessment post-treatment (if applicable):    [x]  intact    []  redness- no adverse reaction     []redness  adverse reaction:        36 min Therapeutic Exercise:  [x]  See flow sheet   Rationale:      increase ROM, increase strength and improve coordination to improve the patients ability to safely perform ADLs, bending/stooping/ lifting; prolong sitting, standing and ambulation; and negotiate stairs with minimal to no pain and with min to no limitations     20 min Manual Therapy: OP to L/s for ext mobs  Astym to entire L/s   increase ROM, increase tissue extensibility, decrease trigger points and increase postural awareness to attain functional use and participation with ADL's   IASTM static cupping to L/s while performed prone hip ext, REIL, and prone glut kick   Rationale:      decrease pain, increase ROM, increase tissue extensibility, decrease trigger points and increase postural awareness to improve patient's ability to safely perform ADLs, bending/stooping/ lifting; prolong sitting, standing and ambulation; and negotiate stairs with minimal to no pain and with min to no limitations        15 min Therapeutic Activity: [x]  See flow sheet  postural training  wall squat with SB  hip hinge with bosu squats to promote neutral spine for bending/lifting/transfers  RDLs with dowel   Rationale:    increase ROM, increase strength and improve coordination to improve the patients ability to safely perform ADLs, bending/stooping/ lifting; prolong sitting, standing and ambulation; and negotiate stairs with minimal to no pain and with min to no limitations      Billed With/As:   [x] TE   [x] TA   [] Neuro   [] Self Care Patient Education: [x] Review HEP    [] Progressed/Changed HEP based on:   [x] positioning   [x] body mechanics   [x] transfers   [x] heat/ice application    [x] other: Pt ed on importance and benefits of compliance with HEP, core strength/stability and proper posture; pt verbalized understanding       Other Objective/Functional Measures:  VCs + demo to perform proper technique for TE  initiated  TRX fall out, and bosu squats    OOV: BKFO with GTB with UE raised; dead bug with UE, butt burners, and SLR   c/o pain with supine<>SLing<>prone, reduced after manual  REIL> with therapist OP- reduced pain, instructed to perform prior to bed, 2 x 10 to manage sxs and apply CP and assess outcome with pain during the night; pt verbalized understanding      increased pain after SLR on OOV  pt ed on mechanics with mowing lawn, and with bending in the garage   Post Treatment Pain Level (on 0 to 10) scale:   2 / 10     ASSESSMENT  Assessment/Changes in Function:   intermittent pain throughout the week  demos squat bosu with no pain      []  See Progress Note/Recertification   Patient will continue to benefit from skilled PT services to modify and progress therapeutic interventions, address functional mobility deficits, address ROM deficits, address strength deficits, analyze and address soft tissue restrictions, analyze and cue movement patterns, analyze and modify body mechanics/ergonomics, assess and modify postural abnormalities and instruct in home and community integration to attain remaining goals. Progress toward goals / Updated goals:  New Goals to be achieved in __4__  weeks from 5/27/21:  1. Patient to be Safe and Independent with HEP to self-manage/prevent symptoms after DC  2. Pt to have no pain with brushing teeth to return to PLOF  3.  Pt demo proper lifting mechanics with no c/o p!        PLAN  [x]  Upgrade activities as tolerated yes Continue plan of care   []  Discharge due to :    []  Other:      Therapist: Yuliya Holm PTA    Date: 6/7/2021 Time: 2:38 PM     Future Appointments   Date Time Provider Yamil Dominguez   6/7/2021  2:45 PM Fiordaliza Weldon PTA [facility]   6/14/2021  2:45 PM MARTIN Canales [facility]   6/21/2021  2:45 PM Madonna Callejas PTA [facility]

## 2021-06-07 NOTE — TELEPHONE ENCOUNTER
Patient reports his surgeon ok'd for him to get an MRI done. He prefers a facility in [place]. Please prepare order and contact patient for scheduling at [phone].

## 2021-06-08 DIAGNOSIS — M43.16 SPONDYLOLISTHESIS AT L4-L5 LEVEL: ICD-10-CM

## 2021-06-08 DIAGNOSIS — M51.37 DDD (DEGENERATIVE DISC DISEASE), LUMBOSACRAL: ICD-10-CM

## 2021-06-08 NOTE — TELEPHONE ENCOUNTER
Did not receive notes faxed, however [facility] Per OVN with Dr. Amilcar Abdul in Care Everywhere:    \"Assessment/Plan: Very small, less than 2 mm, fragment of metal in the subcutaneous tissue of the abdomen. It is not palpable. I think would be incredibly difficult to remove operatively as I do not imagine it would show up on C arm. In order to remove it we would likely need to do CT-guided localization first. I also think it is incredibly unlikely to move under MRI and even if it were to move it would just moved within the subcutaneous tissue. I do not believe it could get into the peritoneal cavity or cause any problems. Therefore, I think it would be reasonable to proceed with the MRI of his spine for work-up of his back pain and only plan on removing this if it becomes symptomatic or interferes with the MRI in some way. If so he will come back otherwise I will see him on an as-needed basis. \"      Please review/advise.

## 2021-06-09 ENCOUNTER — APPOINTMENT (OUTPATIENT)
Dept: PHYSICAL THERAPY | Age: 36
End: 2021-06-09
Attending: NURSE PRACTITIONER
Payer: COMMERCIAL

## 2021-06-09 NOTE — TELEPHONE ENCOUNTER
MRI of the Lumbar Spine without contrast is scheduled at [facility] [address], [address], [address], [phone],, on 06/18/21, arroive 4:15PM, test 4:30PM. No BCBS FEP pre-authorization required

## 2021-06-14 ENCOUNTER — HOSPITAL ENCOUNTER (OUTPATIENT)
Dept: PHYSICAL THERAPY | Age: 36
Discharge: HOME | End: 2021-06-14
Attending: NURSE PRACTITIONER
Payer: COMMERCIAL

## 2021-06-14 PROCEDURE — 97530 THERAPEUTIC ACTIVITIES: CPT

## 2021-06-14 PROCEDURE — 97110 THERAPEUTIC EXERCISES: CPT

## 2021-06-14 NOTE — PROGRESS NOTES
PHYSICAL THERAPY - DAILY TREATMENT NOTE    Patient Name: Blanca Graham        Date: 2021  : 1985   yes Patient  Verified  Visit #:     Insurance: Payor: Jason Rodriguez / [facility]: [facility] / Product Type: PPO /      In time: 245 Out time: 340   Total Treatment Time: 55     Medicare/[employer] Time Tracking (below)   Total Timed Codes (min):  55 1:1 Treatment Time:  55     TREATMENT AREA =  DDD (degenerative disc disease), lumbosacral [M51.37]    SUBJECTIVE  Pain Level (on 0 to 10 scale): 2/ 10   Medication Changes/New allergies or changes in medical history, any new surgeries or procedures?    no  If yes, update Summary List   Subjective Functional Status/Changes:  []  No changes reported     \"I was ok all weekend but then last night it was terrible. I just don't understand.  I get my MRI Friday\"       OBJECTIVE      35 min Therapeutic Exercise:  [x]  See flow sheet   Rationale:      increase ROM, increase strength and improve coordination to improve the patients ability to safely perform ADLs, bending/stooping/ lifting; prolong sitting, standing and ambulation; and negotiate stairs with minimal to no pain and with min to no limitations         20 min Therapeutic Activity: [x]  See flow sheet  postural training  wall squat with SB  hip hinge with bosu squats to promote neutral spine for bending/lifting/transfers  RDLs with 5#>10#>   Rationale:    increase ROM, increase strength and improve coordination to improve the patients ability to safely perform ADLs, bending/stooping/ lifting; prolong sitting, standing and ambulation; and negotiate stairs with minimal to no pain and with min to no limitations      Billed With/As:   [x] TE   [x] TA   [] Neuro   [] Self Care Patient Education: [x] Review HEP    [] Progressed/Changed HEP based on:   [x] positioning   [x] body mechanics   [x] transfers   [x] heat/ice application    [x] other: Pt ed on importance and benefits of compliance with HEP, core strength/stability and proper posture; pt verbalized understanding       Other Objective/Functional Measures:  VCs + demo to perform proper technique for TE  initiated kick start with GTB, and increased to [place] with hip abd/ext with no c/o p!  added 5# to RDL, performed 10 reps to knees with no pain, increased to 10# with no pain    OOV: BKFO with GTB with UE raised; dead bug with UE, and SLR      Post Treatment Pain Level (on 0 to 10) scale:   0/ 10     ASSESSMENT  Assessment/Changes in Function:   Progressed TE without c/o increase p!  performed prone glut kick with no pain, attempted last week and pt was unable due to pain   []  See Progress Note/Recertification   Patient will continue to benefit from skilled PT services to modify and progress therapeutic interventions, address functional mobility deficits, address ROM deficits, address strength deficits, analyze and address soft tissue restrictions, analyze and cue movement patterns, analyze and modify body mechanics/ergonomics, assess and modify postural abnormalities and instruct in home and community integration to attain remaining goals. Progress toward goals / Updated goals:  New Goals to be achieved in __4__  weeks from 5/27/21:  1. Patient to be Safe and Independent with HEP to self-manage/prevent symptoms after DC  2. Pt to have no pain with brushing teeth to return to PLOF  3.  Pt demo proper lifting mechanics with no c/o p!        PLAN  [x]  Upgrade activities as tolerated yes Continue plan of care   []  Discharge due to :    []  Other:      Therapist: Chato Chan PTA    Date: 6/14/2021 Time: 6:13 PM     Future Appointments   Date Time Provider Yamil Dominguez   6/21/2021  2:45 PM Charisma Troncoso [facility]   6/29/2021  3:30 PM Evelin Hernandez PTA [facility]   7/6/2021  2:45 PM Evelin Hernandez PTA [facility]   7/12/2021  3:15 PM Luke Campo MD [facility]   7/13/2021  2:45 PM Evelin Hernandez PTA [facility]   7/20/2021  2:[facility]   7/27/2021  2:45 PM Madonna Callejas PTA [facility]

## 2021-06-22 ENCOUNTER — HOSPITAL ENCOUNTER (OUTPATIENT)
Dept: PHYSICAL THERAPY | Age: 36
Discharge: HOME | End: 2021-06-22
Attending: NURSE PRACTITIONER
Payer: COMMERCIAL

## 2021-06-22 PROCEDURE — 97110 THERAPEUTIC EXERCISES: CPT

## 2021-06-22 PROCEDURE — 97530 THERAPEUTIC ACTIVITIES: CPT

## 2021-06-22 NOTE — PROGRESS NOTES
PHYSICAL THERAPY - DAILY TREATMENT NOTE    Patient Name: Adrian Cates        Date: 2021  : 1985   yes Patient  Verified  Visit #:   15 of   21  Insurance: Payor: Beverly Howell / [facility]: [facility] / Product Type: PPO /      In time: 846 Out time: 780   Total Treatment Time: 61     Medicare/[employer] Time Tracking (below)   Total Timed Codes (min):  61 1:1 Treatment Time: 61     TREATMENT AREA =  DDD (degenerative disc disease), lumbosacral [M51.37]    SUBJECTIVE  Pain Level (on 0 to 10 scale): 2/ 10   Medication Changes/New allergies or changes in medical history, any new surgeries or procedures?    no  If yes, update Summary List   Subjective Functional Status/Changes:  []  No changes reported     \"I was ok all weekend but then last night it was terrible. I just don't understand.  I get my MRI Friday\"       OBJECTIVE      41 min Therapeutic Exercise:  [x]  See flow sheet   Rationale:      increase ROM, increase strength and improve coordination to improve the patients ability to safely perform ADLs, bending/stooping/ lifting; prolong sitting, standing and ambulation; and negotiate stairs with minimal to no pain and with min to no limitations         20 min Therapeutic Activity: [x]  See flow sheet  postural training  bosu Squats  SL RDLs with 5#>Dble LE RDLs with 10#  goblet    Rationale:    increase ROM, increase strength and improve coordination to improve the patients ability to safely perform ADLs, bending/stooping/ lifting; prolong sitting, standing and ambulation; and negotiate stairs with minimal to no pain and with min to no limitations      Billed With/As:   [x] TE   [x] TA   [] Neuro   [] Self Care Patient Education: [x] Review HEP    [] Progressed/Changed HEP based on:   [x] positioning   [x] body mechanics   [x] transfers   [x] heat/ice application    [x] other: Pt ed on importance and benefits of compliance with HEP, core strength/stability and proper posture; pt verbalized understanding       Other Objective/Functional Measures:  VCs + demo to perform proper technique for TE  initiated SL RDLs, TRX pulls/pushes/lunges, and goblet squats with no c/o p!  demos good form and no pain with bird dog on SB     Post Treatment Pain Level (on 0 to 10) scale:   3/ 10     ASSESSMENT  Assessment/Changes in Function:   demos good squat form with no pain noted   TCs/VCs to decrease Hip ER with SL RDLs  c/o min discomfort with SB bridge   []  See Progress Note/Recertification   Patient will continue to benefit from skilled PT services to modify and progress therapeutic interventions, address functional mobility deficits, address ROM deficits, address strength deficits, analyze and address soft tissue restrictions, analyze and cue movement patterns, analyze and modify body mechanics/ergonomics, assess and modify postural abnormalities and instruct in home and community integration to attain remaining goals. Progress toward goals / Updated goals:  New Goals to be achieved in __4__  weeks from 5/27/21:  1. Patient to be Safe and Independent with HEP to self-manage/prevent symptoms after DC  2. Pt to have no pain with brushing teeth to return to PLOF  3. Pt demo proper lifting mechanics with no c/o p!      PLAN  [x]  Upgrade activities as tolerated yes Continue plan of care   []  Discharge due to :    []  Other:      Therapist: Sena Bull PTA    Date: 6/22/2021 Time: 4:04 PM     Future Appointments   Date Time Provider Yamil Dominguez   6/29/2021  3:30 PM Karla Davis [facility]   7/6/2021  2:45 PM Elpidio Vega PTA [facility]   7/12/2021  3:15 PM Jake Carrasco MD [facility]   7/13/2021  2:45 PM Elpidio Vega PTA [facility]   7/20/2021  2:45 PM MARTIN Briscoe [facility]   7/27/2021  2:45 PM Madonna Callejas PTA [facility]

## 2021-06-24 RX ORDER — IBUPROFEN 800 MG/1
TABLET ORAL
Qty: 270 TABLET | Refills: 0 | Status: SHIPPED | OUTPATIENT
Start: 2021-06-24

## 2021-06-25 DIAGNOSIS — M43.16 SPONDYLOLISTHESIS AT L4-L5 LEVEL: ICD-10-CM

## 2021-06-25 DIAGNOSIS — M51.37 DDD (DEGENERATIVE DISC DISEASE), LUMBOSACRAL: ICD-10-CM

## 2021-06-25 RX ORDER — BACLOFEN 10 MG/1
5-10 TABLET ORAL
Qty: 60 TABLET | Refills: 1 | Status: SHIPPED | OUTPATIENT
Start: 2021-06-25

## 2021-06-29 ENCOUNTER — HOSPITAL ENCOUNTER (OUTPATIENT)
Dept: PHYSICAL THERAPY | Age: 36
Discharge: HOME | End: 2021-06-29
Attending: NURSE PRACTITIONER
Payer: COMMERCIAL

## 2021-06-29 PROCEDURE — 97110 THERAPEUTIC EXERCISES: CPT

## 2021-06-29 PROCEDURE — 97530 THERAPEUTIC ACTIVITIES: CPT

## 2021-06-29 NOTE — PROGRESS NOTES
[facility] PHYSICAL THERAPY  [address], [address] - Phone: [phone]  Fax: [phone]  PROGRESS NOTE  Patient Name: Eulogio Wells : 1985   Treatment/Medical Diagnosis: DDD (degenerative disc disease), lumbosacral [M51.37]   Referral Source: Chris Lindsey NP Provider #  [id number]   Date of Initial Visit: 21 Attended Visits: 9 Missed Visits: 0     SUMMARY OF TREATMENT  Patient's POC has consisted of therex, therapeutic activities, manual therapy prn, Rep L/s movements (MDT/Kartik Method), modalities prn, pt. education, and a comprehensive HEP. Treatment strategies used to address functional mobility deficits, ROM deficits, strength deficits, analyze and address soft tissue restrictions, analyze and cue movement patterns, analyze and modify body mechanics/ergonomics, assess and modify postural abnormalities and instruct in home and community integration. CURRENT STATUS  Tonio Henry continues to make steady progress towards goals in PT for LBP as we progress PREs without pain > 3/10. Pt has improved in compliance with HEP, and is able to perform 4 hrs of yard work or building structures with out pain limiting him. Pt is now (I) with managing sxs and intermittently abolishing sxs with Rep L/s movements (MDT/Katrik Method). In clinic Pt demos proper squat form with 20# KB and lifting form without  increase pain. Pt's prolong lying position at night when sleeping continues to be the most contributing factor to his limitations upon rising out of bed. Pt reports 10% improvement when brushing his teeth. Goal/Measure of Progress Goal Met? 1. Patient to be Safe and Independent with HEP to self-manage/prevent symptoms after DC. Status at last Eval: ReEstablished HEP Current Status: moderate compliance with HEP progressing   2. Pt to have no pain with brushing teeth to return to PLOF.     Status at last Eval: worse pain Current Status: 10% improvement progressing   3. Pt demo proper lifting mechanics with no c/o p! Status at last Eval: pain Current Status: demos pain free lifting with instruction yes     New Goals to be achieved in __4__  weeks:  1. Patient to be Safe and Independent with HEP to self-manage/prevent symptoms after DC  2. Pt to have no pain with brushing teeth to return to PLOF  3. Pt to report max p! 2/10 with ADL's to promote PLOF. RECOMMENDATIONS  Pt will benefit from further skilled PT services 2x wk x 4 wks to increase strength/stability and decrease sxs to promote functional mobility. If you have any questions/comments please contact us directly at [phone]. Thank you for allowing us to assist in the care of your patient. LPTA Signature: Mylinda Klinefelter, PTA  Date: 6/29/2021   PT Signature: Beto De La Rosa \"BJ\" Marquita Post, DPT, Cert. MDT, Cert. DN, Cert. SMT, Dip. Osteopractic Time: 9:19 AM   NOTE TO PHYSICIAN:  PLEASE COMPLETE THE ORDERS BELOW AND FAX TO   [facility]: [phone]. If you are unable to process this request in 24 hours please contact our office: [phone].    ___ I have read the above report and request that my patient continue as recommended.   ___ I have read the above report and request that my patient continue therapy with the following changes/special instructions:_________________________________________________________   ___ I have read the above report and request that my patient be discharged from therapy.      Physician Signature:        Date:       Time:                                  Tal aLma NP

## 2021-06-29 NOTE — PROGRESS NOTES
PHYSICAL THERAPY - DAILY TREATMENT NOTE    Patient Name: Yamil Bradshaw        Date: 2021  : 1985   yes Patient  Verified  Visit #:   15 of   21  Insurance: Payor: Ni Michaels / Plan: [address] [facility] / Product Type: PPO /      In time: 846 Out time: 991   Total Treatment Time: 61     Medicare/[facility] Time Tracking (below)   Total Timed Codes (min):  61 1:1 Treatment Time: 61     TREATMENT AREA =  DDD (degenerative disc disease), lumbosacral [M51.37]    SUBJECTIVE  Pain Level (on 0 to 10 scale): 2/ 10   Medication Changes/New allergies or changes in medical history, any new surgeries or procedures?    no  If yes, update Summary List   Subjective Functional Status/Changes:  []  No changes reported     Pt reports 10% improvement with brushing teeth but most c/o pain is when he wakes up in the morning initially with bed mobility        OBJECTIVE       41 min Therapeutic Exercise:  [x]  See flow sheet   Rationale:      increase ROM, increase strength and improve coordination to improve the patients ability to safely perform ADLs, bending/stooping/ lifting; prolong sitting, standing and ambulation; and negotiate stairs with minimal to no pain and with min to no limitations         20 min Therapeutic Activity: [x]  See flow sheet   Rationale:    increase ROM, increase strength and improve coordination to improve the patients ability to safely perform ADLs, bending/stooping/ lifting; prolong sitting, standing and ambulation; and negotiate stairs with minimal to no pain and with min to no limitations      Billed With/As:   [x] TE   [x] TA   [] Neuro   [] Self Care Patient Education: [x] Review HEP    [] Progressed/Changed HEP based on:   [x] positioning   [x] body mechanics   [x] transfers   [x] heat/ice application    [x] other: Pt ed on importance and benefits of compliance with HEP, core strength/stability and proper posture; pt verbalized understanding       Other Objective/Functional Measures:  VCs + demo to perform proper technique for TE  initiated TE per flowsheet with no c/o p! Rep RSG in door> wall-P/B abolished pain, decrease pain with bridges and demos aligned innominates when checking SIJ  See PN      Post Treatment Pain Level (on 0 to 10) scale:   0/ 10     ASSESSMENT  Assessment/Changes in Function:   See PN     []  See Progress Note/Recertification   Patient will continue to benefit from skilled PT services to modify and progress therapeutic interventions, address functional mobility deficits, address ROM deficits, address strength deficits, analyze and address soft tissue restrictions, analyze and cue movement patterns, analyze and modify body mechanics/ergonomics, assess and modify postural abnormalities and instruct in home and community integration to attain remaining goals. Progress toward goals / Updated goals:  New Goals to be achieved in __4__  weeks from 5/27/21:  1. Patient to be Safe and Independent with HEP to self-manage/prevent symptoms after DC  2. Pt to have no pain with brushing teeth to return to PLOF. progressing 10% improvement  3. Pt demo proper lifting mechanics with no c/o p!      PLAN  [x]  Upgrade activities as tolerated yes Continue plan of care   []  Discharge due to :    []  Other:      Therapist: Aristides Suarez PTA    Date: 6/29/2021 Time: 5:55 PM     Future Appointments   Date Time Provider Yamil Dominguez   7/6/2021  2:45 PM Toy Elder [facility]   7/12/2021  3:15 PM Eden Salinas MD [facility]   7/13/2021  2:45 PM Yenny Kumar PTA [facility]   7/20/2021  2:45 PM Yenny Kumar PTA [facility]   7/27/2021  2:45 PM Madonna Callejas PTA [facility]

## 2021-07-06 ENCOUNTER — HOSPITAL ENCOUNTER (OUTPATIENT)
Dept: PHYSICAL THERAPY | Age: 36
Discharge: HOME | End: 2021-07-06
Attending: NURSE PRACTITIONER
Payer: COMMERCIAL

## 2021-07-06 PROCEDURE — 97140 MANUAL THERAPY 1/> REGIONS: CPT

## 2021-07-06 PROCEDURE — 97110 THERAPEUTIC EXERCISES: CPT

## 2021-07-06 NOTE — PROGRESS NOTES
PHYSICAL THERAPY - DAILY TREATMENT NOTE    Patient Name: Raquel Fletcher        Date: 2021  : 1985   yes Patient  Verified  Visit #:     Insurance: Payor: BLUE CROSS / [facility] / Product Type: PPO /      In time: 255 Out time: 330   Total Treatment Time: 35     Medicare/HiWiFi Time Tracking (below)   Total Timed Codes (min): 35 1:1 Treatment Time:35     TREATMENT AREA =  DDD (degenerative disc disease), lumbosacral [M51.37]    SUBJECTIVE  Pain Level (on 0 to 10 scale): 2/ 10   Medication Changes/New allergies or changes in medical history, any new surgeries or procedures?    no  If yes, update Summary List   Subjective Functional Status/Changes:  []  No changes reported     \"I woke up the next day after last time and it was great no pain for 2 days, and I was able to brush my and bend over in the sink to wet my hair.  I was so happy\"       OBJECTIVE      15 min Therapeutic Exercise:  [x]  See flow sheet   Rationale:      increase ROM, increase strength and improve coordination to improve the patients ability to safely perform ADLs, bending/stooping/ lifting; prolong sitting, standing and ambulation; and negotiate stairs with minimal to no pain and with min to no limitations         20 min manual therapy: [x]  See flow sheet  OP to L/s HOC left> hips centered    Rationale:    increase ROM, increase strength and improve coordination to improve the patients ability to safely perform ADLs, bending/stooping/ lifting; prolong sitting, standing and ambulation; and negotiate stairs with minimal to no pain and with min to no limitations      Billed With/As:   [x] TE   [x] TA   [] Neuro   [] Self Care Patient Education: [x] Review HEP    [] Progressed/Changed HEP based on:   [x] positioning   [x] body mechanics   [x] transfers   [x] heat/ice application    [x] other: Pt ed on importance and benefits of compliance with HEP, core strength/stability and proper posture; pt verbalized understanding       Other Objective/Functional Measures:  VCs + demo to perform proper technique for TE  initiated TE per flowsheet with no c/o p! Rep RSG at wall- * note instructed to perform to end range and hold- B/B pain reduced, not able to abolish  REIL HOC Left- B/B pain reduced  attempted OOV, pt with intermittent \"sharp \" pain  OP to L/s hips centered- able to abolish pain  Pt ed on lateral component and performing rep RSG at wall with R radic sxs, and to perform REIL with sxs centralized; pt verbalized understanding      Post Treatment Pain Level (on 0 to 10) scale:   0/ 10     ASSESSMENT  Assessment/Changes in Function:   able to abolish pain  pt with better understanding of centralizing    []  See Progress Note/Recertification   Patient will continue to benefit from skilled PT services to modify and progress therapeutic interventions, address functional mobility deficits, address ROM deficits, address strength deficits, analyze and address soft tissue restrictions, analyze and cue movement patterns, analyze and modify body mechanics/ergonomics, assess and modify postural abnormalities and instruct in home and community integration to attain remaining goals.    Progress toward goals / Updated goals:  See PN last visit, no changes noted at this time       PLAN  [x]  Upgrade activities as tolerated yes Continue plan of care   []  Discharge due to :    []  Other:      Therapist: Chantell Kuhn PTA    Date: 7/6/2021 Time: 5:04 PM     Future Appointments   Date Time Provider Yamil Dominguez   7/12/2021  3:15 PM Sarah Wilson MD [facility] AMB   7/13/2021  2:45 PM Antonieta Phelan PTA [facility]   7/20/2021  2:45 PM MARTIN RossPTGIANNI [facility]   7/27/2021  2:45 PM Madonna Callejas PTA [facility]

## 2021-07-12 ENCOUNTER — OFFICE VISIT (OUTPATIENT)
Dept: ORTHOPEDIC SURGERY | Age: 36
End: 2021-07-12
Payer: COMMERCIAL

## 2021-07-12 VITALS
OXYGEN SATURATION: 97 % | WEIGHT: 210 LBS | HEIGHT: 74 IN | TEMPERATURE: 97.2 F | HEART RATE: 70 BPM | BODY MASS INDEX: 26.95 KG/M2

## 2021-07-12 DIAGNOSIS — M47.816 LUMBAR SPONDYLOSIS: ICD-10-CM

## 2021-07-12 DIAGNOSIS — M62.838 MUSCLE SPASM: ICD-10-CM

## 2021-07-12 DIAGNOSIS — M51.37 DDD (DEGENERATIVE DISC DISEASE), LUMBOSACRAL: Primary | ICD-10-CM

## 2021-07-12 PROCEDURE — 99213 OFFICE O/P EST LOW 20 MIN: CPT | Performed by: PHYSICAL MEDICINE & REHABILITATION

## 2021-07-12 NOTE — PROGRESS NOTES
Dipika Mcdonald presents today for   Chief Complaint   Patient presents with    Back Pain       Is someone accompanying this pt? no    Is the patient using any DME equipment during OV? no    Depression Screening:  3 most recent PHQ Screens 3/31/2021   Little interest or pleasure in doing things Not at all   Feeling down, depressed, irritable, or hopeless Not at all   Total Score PHQ 2 0       Coordination of Care:  1. Have you been to the ER, urgent care clinic since your last visit? no  Hospitalized since your last visit? no    2. Have you seen or consulted any other health care providers outside of the [address] [place] [address] since your last visit? Yes, physical therapy Include any pap smears or colon screening.  no

## 2021-07-12 NOTE — PROGRESS NOTES
Verbal order entered per Dr. Berry Door as documented on blue sheet: physical therapy referral- eval for dry needling, right lumbar  2.  Referral to spine surgery- Dr. Misha Garcia DDD L5-S1

## 2021-07-12 NOTE — LETTER
7/12/2021    Patient: Chen Brito   YOB: 1985   Date of Visit: 7/12/2021     Monik Ordoñez, [address] [facility] [address]  Via Fax: [phone]    Dear Monik Ordoñez MD,      Thank you for referring Mr. Katerina Norris to [address] [place] [address] for evaluation. My notes for this consultation are attached. If you have questions, please do not hesitate to call me. I look forward to following your patient along with you.       Sincerely,    Surjit Quinn MD

## 2021-07-12 NOTE — PROGRESS NOTES
Janae Rojas [facility] [address]  [address], [address]  [place]  Phone: [phone]  Fax: [phone]        Fabiana Dickerson  : 1985  PCP: Antonia Yanes MD    PROGRESS NOTE      ASSESSMENT AND PLAN    Diagnoses and all orders for this visit:    1. DDD (degenerative disc disease), lumbosacral  -     REFERRAL TO PHYSICAL THERAPY  -     REFERRAL TO SPINE SURGERY    2. Lumbar spondylosis  -     REFERRAL TO PHYSICAL THERAPY  -     REFERRAL TO SPINE SURGERY    3. Muscle spasm  -     REFERRAL TO PHYSICAL THERAPY        1. Ruthell Simmonds is a 39 y.o. male [employer]  with discogenic low back pain, worse over the years. Other discs are healthy. 2. Discussed possibility of intradiscal RF, disc replacement, or fusion surgery in the future. Refer to Dr. Saman Braun to discuss options. 3. Referral to PT with use of dry needling  4. Advised to continue HEP. Follow-up and Dispositions    · Return if symptoms worsen or fail to improve. HISTORY OF PRESENT ILLNESS      Ruthell Simmonds is a 39 y.o. male presenting for MRI follow up. Since last office visit, patient has been participating in physical therapy that provides a mild benefit. He states that he has some mornings he has increased pain, and other mornings with no pain. He notes frequent sharp pains with certain movements that radiate up and down his spine. Patient communicates one specific exercise that helped relieve his pain for two days before returning that no longer seems to help. He states that his pain is the worst at nighttime. Pain Assessment  2021   Location of Pain Back   Location Modifiers -   Severity of Pain 3   Quality of Pain Dull; Sharp   Quality of Pain Comment -   Duration of Pain Persistent   Frequency of Pain Constant   Aggravating Factors Other (Comment)   Aggravating Factors Comment depends on the day- some days everything hurts, other days are better   Limiting Behavior Some   Relieving Factors Other (Comment)   Relieving Factors Comment sitting   Result of Injury No         Treatments patient has tried:  Physical therapy: Yes 2020 no benefit, 2021 with mild benefit  OMT: w/ Dr. Hickman Favorite with benefit  Doing HEP: Unknown  Beneficial medications: Motrin 800 mg (taking but knows he shouldn't)  Failed medications: Unable to take NSAIDs - stomach issues  Spinal injections: No     Spinal surgery- No.   Spine surgery consult: No.     L MRI 6/2021(CR):  disc bulges L4/5/S1 with moderate FA. My read- significant DDD L5-S1  L XR AP/lat/flex/ext/ob 6V 3/31/2021 (I personally reviewed these images): Disc space narrowing L5-S1 with anterior osteophytes.  Trace retrolisthesis at L4-5.  No pars defect is appreciated  Head MRI 2016: normal (done for numbness of R hand and foot)     PMHx of ACL repair, HLD, urinary frequency, reflux, chronic tension headache.   Works as a [employer] . 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     PHYSICAL EXAMINATION  Visit Vitals  Pulse 70   Temp 97.2 °F (36.2 °C) (Tympanic)   Ht 6' 2\" (1.88 m)   Wt 210 lb (95.3 kg)   SpO2 97% Comment: RA   BMI 26.96 kg/m²     FF limited fingertips to knees. Increased pain with extension on the right. Taut band. No difficulty with tandem gait. Intact heel walk. Intact heel rise. Intact squat. Mr. Yadira Corona may have a reminder for a \"due or due soon\" health maintenance. I have asked that he contact his primary care provider for follow-up on this health maintenance. This note was created using Dragon transcription software, unintended errors may be present. Written by Kota Burrell, as dictated by Everlena Severs, MD.  I, Dr. Everlena Severs, MD, confirm that all documentation is accurate.

## 2021-07-13 ENCOUNTER — APPOINTMENT (OUTPATIENT)
Dept: PHYSICAL THERAPY | Age: 36
End: 2021-07-13
Attending: NURSE PRACTITIONER
Payer: COMMERCIAL

## 2021-07-20 ENCOUNTER — APPOINTMENT (OUTPATIENT)
Dept: PHYSICAL THERAPY | Age: 36
End: 2021-07-20
Attending: NURSE PRACTITIONER
Payer: COMMERCIAL

## 2021-07-27 ENCOUNTER — APPOINTMENT (OUTPATIENT)
Dept: PHYSICAL THERAPY | Age: 36
End: 2021-07-27
Attending: NURSE PRACTITIONER
Payer: COMMERCIAL

## 2021-08-03 ENCOUNTER — OFFICE VISIT (OUTPATIENT)
Dept: ORTHOPEDIC SURGERY | Age: 36
End: 2021-08-03
Payer: COMMERCIAL

## 2021-08-03 VITALS
BODY MASS INDEX: 27.59 KG/M2 | TEMPERATURE: 98 F | OXYGEN SATURATION: 96 % | HEIGHT: 74 IN | WEIGHT: 215 LBS | RESPIRATION RATE: 15 BRPM | HEART RATE: 81 BPM

## 2021-08-03 DIAGNOSIS — M51.37 DDD (DEGENERATIVE DISC DISEASE), LUMBOSACRAL: Primary | ICD-10-CM

## 2021-08-03 DIAGNOSIS — M47.816 LUMBAR SPONDYLOSIS: ICD-10-CM

## 2021-08-03 DIAGNOSIS — M99.04 SACRAL REGION SOMATIC DYSFUNCTION: ICD-10-CM

## 2021-08-03 DIAGNOSIS — M99.02 THORACIC REGION SOMATIC DYSFUNCTION: ICD-10-CM

## 2021-08-03 DIAGNOSIS — M99.03 LUMBAR REGION SOMATIC DYSFUNCTION: ICD-10-CM

## 2021-08-03 DIAGNOSIS — M99.05 PELVIC SOMATIC DYSFUNCTION: ICD-10-CM

## 2021-08-03 DIAGNOSIS — M51.36 DEGENERATION, INTERVERTEBRAL DISC, LUMBAR: ICD-10-CM

## 2021-08-03 DIAGNOSIS — M99.08 RIB CAGE REGION SOMATIC DYSFUNCTION: ICD-10-CM

## 2021-08-03 PROCEDURE — 98927 OSTEOPATH MANJ 5-6 REGIONS: CPT | Performed by: FAMILY MEDICINE

## 2021-08-03 PROCEDURE — 99214 OFFICE O/P EST MOD 30 MIN: CPT | Performed by: FAMILY MEDICINE

## 2021-08-03 RX ORDER — HYDROCODONE BITARTRATE AND ACETAMINOPHEN 5; 325 MG/1; MG/1
1 TABLET ORAL
Qty: 21 TABLET | Refills: 0 | Status: SHIPPED | OUTPATIENT
Start: 2021-08-03 | End: 2021-08-10

## 2021-08-03 RX ORDER — DIAZEPAM 5 MG/1
2.5-5 TABLET ORAL
Qty: 30 TABLET | Refills: 0 | Status: SHIPPED | OUTPATIENT
Start: 2021-08-03

## 2021-08-03 NOTE — PROGRESS NOTES
HISTORY OF PRESENT ILLNESS    Meño Lassiter 1985 is a 39y.o. year old male comes in today to be evaluated and treated for: back pain    Since last appt has noticed worsening pain and ended up seeing PMR and had MRI done 6/18/2021. Will get shocks and have severe pain at times up his back but will resolve w/ position changes. Did do PT and some improvement and now able to plank w/o pain. Pain level 4/10. Using baclofen/norco rare with benefit. Also hoping to get manipulated as benefit prior. Did see Dr. Lisa Mohan who preferred fusion. IMAGING: MRI lumbar 6/18/2021 (report scanned to media)  1. No high-grade central canal stenosis at any lambar level. 2. Multilevel degenerative changes. Past Surgical History:   Procedure Laterality Date    HX ACL RECONSTRUCTION      20 yrs ago cant remember what side per pt statement      Social History     Socioeconomic History    Marital status: SINGLE     Spouse name: Not on file    Number of children: Not on file    Years of education: Not on file    Highest education level: Not on file   Tobacco Use    Smoking status: Never Smoker    Smokeless tobacco: Never Used   Vaping Use    Vaping Use: Never used   Substance and Sexual Activity    Alcohol use: Yes     Alcohol/week: 1.0 standard drinks     Types: 1 Cans of beer per week     Comment: occasionally    Drug use: No   Social History Narrative    ** Merged History Encounter **          Social Determinants of Health     Financial Resource Strain:     Difficulty of Paying Living Expenses:    Food Insecurity:     Worried About Running Out of Food in the Last Year:     Ran Out of Food in the Last Year:    Transportation Needs:     Lack of Transportation (Medical):      Lack of Transportation (Non-Medical):    Physical Activity:     Days of Exercise per Week:     Minutes of Exercise per Session:    Stress:     Feeling of Stress :    Social Connections:     Frequency of Communication with Friends and Family:     Frequency of Social Gatherings with Friends and Family:     Attends Congregation Services:     Active Member of Clubs or Organizations:     Attends Club or Organization Meetings:     Marital Status:      Current Outpatient Medications   Medication Sig Dispense Refill    baclofen (LIORESAL) 10 mg tablet TAKE 0.5-1 TABS BY MOUTH TWO (2) TIMES DAILY AS NEEDED FOR MUSCLE SPASM(S) OR PAIN 60 Tablet 1    ibuprofen (MOTRIN) 800 mg tablet TAKE 1 TABLET BY MOUTH EVERY 8 HOURS AS NEEDED FOR PAIN 270 Tablet 0    omalizumab (XOLAIR) 150 mg solr 300 mg by SubCUTAneous route every thirty (30) days. Past Medical History:   Diagnosis Date    Arthritis     Decreased urine stream     Dyslipidemia     Erythema multiforme     Heartburn     High cholesterol     Nocturia     Urinary frequency     Urinary urgency     Weak urinary stream      Family History   Problem Relation Age of Onset    No Known Problems Mother     No Known Problems Father     Other Brother      ROS:  No numb, tingle, incont, fever    Objective:  Pulse 81   Temp 98 °F (36.7 °C)   Resp 15   Ht 6' 2\" (1.88 m)   Wt 215 lb (97.5 kg)   SpO2 96%   BMI 27.60 kg/m²   NEURO:  Sensation intact to light touch. Reflexes +2/4 patellar and Achilles bilaterally. M/S:  Examined seated and supine. Slump negative. Standing flexion test positive left  Stork positive left.  ASIS low left Iliac crests equal bilaterally Pubes equal bilaterally Medial malleolus low left  Sacral base posterior left  TRISH low left  Sphinx test Positive left TTA at T3, 5 right worse flexion, L4, 5 on left worse flexion Rib(s) 3, 5, 6 TTP right and posterior. LE Strength +5/5 bilaterally Piriformis normal bilaterally    Assessment/Plan:     ICD-10-CM ICD-9-CM    1. DDD (degenerative disc disease), lumbosacral  M51.37 722.52 REFERRAL TO NEUROSURGERY      HYDROcodone-acetaminophen (Norco) 5-325 mg per tablet      diazePAM (Valium) 5 mg tablet   2.  Lumbar spondylosis M47.816 721.3 REFERRAL TO NEUROSURGERY      HYDROcodone-acetaminophen (Norco) 5-325 mg per tablet      diazePAM (Valium) 5 mg tablet   3. Degeneration, intervertebral disc, lumbar  M51.36 722.52 REFERRAL TO NEUROSURGERY      HYDROcodone-acetaminophen (Norco) 5-325 mg per tablet      diazePAM (Valium) 5 mg tablet   4. Lumbar region somatic dysfunction  M99.03 739.3 [place] OSTEOPATHIC MANIP,5-6 BODY REGN   5. Sacral region somatic dysfunction  M99.04 739.4 [place] OSTEOPATHIC MANIP,5-6 BODY REGN   6. Pelvic somatic dysfunction  M99.05 739.5 [place] OSTEOPATHIC MANIP,5-6 BODY REGN   7. Rib cage region somatic dysfunction  M99.08 739.8 [place] OSTEOPATHIC MANIP,5-6 BODY REGN   8. Thoracic region somatic dysfunction  M99.02 739.2 [place] OSTEOPATHIC MANIP,5-6 BODY REGN         Patient (or guardian if minor) verbalizes understanding of evaluation and plan. Verbal consent obtained. Thoracic, Rib, Lumbar, Pelvic and Sacral SD treated with ME. Correction of previous malalignments verified after Tx. Pt tolerated well. Notes improvement of Sx and pain is now rated 2/10. HEP/stretches daily. Discussed Sx limiting w/ electrical shocks very significant. Will refer Ashley Kuhn for eval spine surgery. Refill norco, valium.  reviewed.

## 2021-08-03 NOTE — PROGRESS NOTES
AVS reviewed: YES  HEP: AT reviewed  Resources Provided: YES Both Videos & Photos  Patient questions/concerns answered: NO pt declines question and concerns   Patient verbalized understanding of treatment plan: YES

## 2021-08-03 NOTE — PATIENT INSTRUCTIONS
Follow up with Dr. Trevor Strong. Take medication as prescribed. Preform exercises daily.   Search YouTube for my channel:    Dr. Mindy [email or web address]

## 2021-08-05 ENCOUNTER — TELEPHONE (OUTPATIENT)
Dept: ORTHOPEDIC SURGERY | Age: 36
End: 2021-08-05

## 2021-08-05 ENCOUNTER — OFFICE VISIT (OUTPATIENT)
Dept: ORTHOPEDIC SURGERY | Age: 36
End: 2021-08-05
Payer: COMMERCIAL

## 2021-08-05 VITALS
OXYGEN SATURATION: 96 % | HEART RATE: 90 BPM | BODY MASS INDEX: 27.03 KG/M2 | TEMPERATURE: 98.5 F | WEIGHT: 210.6 LBS | RESPIRATION RATE: 16 BRPM | HEIGHT: 74 IN

## 2021-08-05 DIAGNOSIS — M99.05 PELVIC SOMATIC DYSFUNCTION: ICD-10-CM

## 2021-08-05 DIAGNOSIS — S29.019A THORACIC MYOFASCIAL STRAIN, INITIAL ENCOUNTER: Primary | ICD-10-CM

## 2021-08-05 DIAGNOSIS — M47.816 LUMBAR SPONDYLOSIS: ICD-10-CM

## 2021-08-05 DIAGNOSIS — M99.04 SACRAL REGION SOMATIC DYSFUNCTION: ICD-10-CM

## 2021-08-05 DIAGNOSIS — M51.37 DDD (DEGENERATIVE DISC DISEASE), LUMBOSACRAL: ICD-10-CM

## 2021-08-05 DIAGNOSIS — M99.03 LUMBAR REGION SOMATIC DYSFUNCTION: ICD-10-CM

## 2021-08-05 DIAGNOSIS — M99.08 RIB CAGE REGION SOMATIC DYSFUNCTION: ICD-10-CM

## 2021-08-05 DIAGNOSIS — M51.36 DEGENERATION, INTERVERTEBRAL DISC, LUMBAR: ICD-10-CM

## 2021-08-05 DIAGNOSIS — M99.02 THORACIC REGION SOMATIC DYSFUNCTION: ICD-10-CM

## 2021-08-05 PROCEDURE — 99214 OFFICE O/P EST MOD 30 MIN: CPT | Performed by: FAMILY MEDICINE

## 2021-08-05 PROCEDURE — 98927 OSTEOPATH MANJ 5-6 REGIONS: CPT | Performed by: FAMILY MEDICINE

## 2021-08-05 NOTE — PATIENT INSTRUCTIONS
Follow up with . Return if symptoms worsen or fail to improve.   Search YouTube for my channel:    Dr. Javid Kaba cuff  Low back/Piriformis  Runner's Knee  Hip Stretches  Plantar Fasciitis  IT Band  Concussion  Pes Anserine/Plica  Arroyo Splints  Carpal Tunnel  Neck/Upper back

## 2021-08-05 NOTE — PROGRESS NOTES
AVS reviewed: YES  HEP: N/A   Resources Provided: Referral  Patient questions/concerns answered: NO pt declined questions or concerns   Patient verbalized understanding of treatment plan: YES

## 2021-08-05 NOTE — PROGRESS NOTES
HISTORY OF PRESENT ILLNESS    Ripley Fabry 1985 is a 39y.o. year old male comes in today to be evaluated and treated for: back pain    Since last appt has noticed yesterday rolled in bed and had severe twinge upper back. Tried a PT maneuver which made pain severe so to ER after ambulance as could not stand. Pain level 6/10. Using norco and ativan (from ER) with some benefit. IMAGING: XR thoracic 8/4/2021  1. No acute pathology appreciated in the thoracic spine. Past Surgical History:   Procedure Laterality Date    HX ACL RECONSTRUCTION      20 yrs ago cant remember what side per pt statement      Social History     Socioeconomic History    Marital status: SINGLE     Spouse name: Not on file    Number of children: Not on file    Years of education: Not on file    Highest education level: Not on file   Tobacco Use    Smoking status: Never Smoker    Smokeless tobacco: Never Used   Vaping Use    Vaping Use: Never used   Substance and Sexual Activity    Alcohol use: Yes     Alcohol/week: 1.0 standard drinks     Types: 1 Cans of beer per week     Comment: occasionally    Drug use: No   Social History Narrative    ** Merged History Encounter **          Social Determinants of Health     Financial Resource Strain:     Difficulty of Paying Living Expenses:    Food Insecurity:     Worried About Running Out of Food in the Last Year:     Ran Out of Food in the Last Year:    Transportation Needs:     Lack of Transportation (Medical):      Lack of Transportation (Non-Medical):    Physical Activity:     Days of Exercise per Week:     Minutes of Exercise per Session:    Stress:     Feeling of Stress :    Social Connections:     Frequency of Communication with Friends and Family:     Frequency of Social Gatherings with Friends and Family:     Attends Religion Services:     Active Member of Clubs or Organizations:     Attends Club or Organization Meetings:     Marital Status:      Current Outpatient Medications   Medication Sig Dispense Refill    HYDROcodone-acetaminophen (Norco) 5-325 mg per tablet Take 1 Tablet by mouth every eight (8) hours as needed for Pain for up to 7 days. Max Daily Amount: 3 Tablets. 21 Tablet 0    diazePAM (Valium) 5 mg tablet Take 0.5-1 Tablets by mouth every twelve (12) hours as needed for Muscle Spasm(s). Max Daily Amount: 10 mg. 30 Tablet 0    baclofen (LIORESAL) 10 mg tablet TAKE 0.5-1 TABS BY MOUTH TWO (2) TIMES DAILY AS NEEDED FOR MUSCLE SPASM(S) OR PAIN 60 Tablet 1    ibuprofen (MOTRIN) 800 mg tablet TAKE 1 TABLET BY MOUTH EVERY 8 HOURS AS NEEDED FOR PAIN 270 Tablet 0    omalizumab (XOLAIR) 150 mg solr 300 mg by SubCUTAneous route every thirty (30) days. Past Medical History:   Diagnosis Date    Arthritis     Decreased urine stream     Dyslipidemia     Erythema multiforme     Heartburn     High cholesterol     Nocturia     Urinary frequency     Urinary urgency     Weak urinary stream      Family History   Problem Relation Age of Onset    No Known Problems Mother     No Known Problems Father     Other Brother        ROS:  No numb, tingle, incont, fever    Objective:  Pulse 90   Temp 98.5 °F (36.9 °C)   Resp 16   Ht 6' 2\" (1.88 m)   Wt 210 lb 9.6 oz (95.5 kg)   SpO2 96%   BMI 27.04 kg/m²   NEURO:  Sensation intact to light touch. Reflexes +2/4 patellar and Achilles bilaterally. M/S:  Examined seated and supine. Slump negative. Standing flexion test positive left  Sphinx test positive right. ASIS low left  Iliac crests equal bilaterally Pubes equal bilaterally Medial malleolus low left  Sacral base posterior left  TRISH low left  TTA at T10, 11 on left worse flexion and L2 on right worse flexion Rib(s) 10, 11 left TTP and posterior LE Strength +5/5 bilaterally Piriformis normal bilaterally      Assessment/Plan:     ICD-10-CM ICD-9-CM    1. Thoracic myofascial strain, initial encounter  A68.918L 847.1    2.  DDD (degenerative disc disease), lumbosacral  M51.37 722.52    3. Lumbar spondylosis  M47.816 721.3    4. Degeneration, intervertebral disc, lumbar  M51.36 722.52    5. Lumbar region somatic dysfunction  M99.03 739.3 [place] OSTEOPATHIC MANIP,5-6 BODY REGN   6. Sacral region somatic dysfunction  M99.04 739.4 [place] OSTEOPATHIC MANIP,5-6 BODY REGN   7. Pelvic somatic dysfunction  M99.05 739.5 [place] OSTEOPATHIC MANIP,5-6 BODY REGN   8. Rib cage region somatic dysfunction  M99.08 739.8 [place] OSTEOPATHIC MANIP,5-6 BODY REGN   9. Thoracic region somatic dysfunction  M99.02 739.2 [place] OSTEOPATHIC MANIP,5-6 BODY REGN       Patient (or guardian if minor) verbalizes understanding of evaluation and plan. Verbal consent obtained. Thoracic, Rib, Lumbar, Pelvic and Sacral SD treated with ME and HVLA. Correction of previous malalignments verified after Tx. Pt tolerated well. Notes improvement of Sx and pain is now rated 2/10. HEP/stretches daily. Discussed rib/thoraci malalignment caused Sx. Mary Jo Luna today and continue meds from prior. Continue seek eval Dr. Kiko Lerma. RTC as needed. Total time spent on encounter including chart/imaging/lab review and evaluation/documentation/demo home program/coordination of care but not including time for any procedures/manipulation 34 minutes.

## 2021-08-05 NOTE — TELEPHONE ENCOUNTER
Patient calling to request call back and to have referral for neorosurgery resent to provider. States he called to schedule and they have indicated they do not have it.     Contact #: [phone]

## 2021-08-06 NOTE — TELEPHONE ENCOUNTER
Message left for pt to return call. Please inform pt that information has been sent and confirmation received that the office received the fax.

## 2021-08-17 NOTE — PROGRESS NOTES
[address] [facility] PHYSICAL THERAPY  [address] [facility] [address], [address] - Phone: [phone]  Fax: [phone]  PROGRESS NOTE  Patient Name: Meeta Sharma : 1985   Treatment/Medical Diagnosis: DDD (degenerative disc disease), lumbosacral [M51.37]   Referral Source: Tal Lama NP Provider #  [id number]   Date of Initial Visit: 21 Attended Visits: 15 Missed Visits: 2     SUMMARY OF TREATMENT  Patient's POC has consisted of therex, therapeutic activities, manual therapy prn, Rep L/s movements (MDT/Kartik Manning), modalities prn, pt. education, and a comprehensive HEP. Treatment strategies used to address functional mobility deficits, ROM deficits, strength deficits, analyze and address soft tissue restrictions, analyze and cue movement patterns, analyze and modify body mechanics/ergonomics, assess and modify postural abnormalities and instruct in home and community integration. CURRENT STATUS  Opal Fox had 1 f/u appt after last PN 21. After last session on 21, pt requested d/c.     Goal/Measure of Progress Goal Met? 1. Patient to be Safe and Independent with HEP to self-manage/prevent symptoms after DC. Status at last Eval: ReEstablished HEP Current Status: (I) with HEP yes   2. Pt to have no pain with brushing teeth to return to PLOF. Status at last Eval: 10% improvement Current Status: NT n/a   3. Pt to report max p! 2/10 with ADL's to promote PLOF. Status at last Eval: >2/10 p! Current Status: NT n/a     RECOMMENDATIONS  Pt request d/c at this time. Instructed to continue HEP (I). If you have any questions/comments please contact us directly at [phone]. Thank you for allowing us to assist in the care of your patient. LPTA Signature: Mylinda Klinefelter, MARTIN  Date: 2021   PT Signature: Beto De La Rosa \"BJ\" Marquita Post, DPT, Cert. MDT, Cert. DN, Cert. SMT, Dip.  Osteopractic Time: 4:39 PM   NOTE TO PHYSICIAN:  PLEASE COMPLETE THE ORDERS BELOW AND FAX TO   [facility] Physical Therapy: [phone]. If you are unable to process this request in 24 hours please contact our office: [phone].    ___ I have read the above report and request that my patient continue as recommended.   ___ I have read the above report and request that my patient continue therapy with the following changes/special instructions:_________________________________________________________   ___ I have read the above report and request that my patient be discharged from therapy.      Physician Signature:        Date:       Time:                                  Mali Mayo NP

## 2022-01-05 NOTE — PROGRESS NOTES
IV removed. Catheter intact and site benign. Pressure and 4x4 applied to site. 
No bleeding noted.Patient discharged to home in stable condition. Written and 
verbal after care instructions given. Patient verbalizes understanding of 
instruction. Pt reports attending PT; unsure of benefit. Reports pain upon waking.

## 2022-03-19 PROBLEM — M51.379 DDD (DEGENERATIVE DISC DISEASE), LUMBOSACRAL: Status: ACTIVE | Noted: 2021-04-02

## 2022-05-11 ENCOUNTER — OFFICE VISIT (OUTPATIENT)
Dept: ORTHOPEDIC SURGERY | Age: 37
End: 2022-05-11
Payer: COMMERCIAL

## 2022-05-11 VITALS
WEIGHT: 229.6 LBS | OXYGEN SATURATION: 98 % | HEIGHT: 74 IN | HEART RATE: 71 BPM | BODY MASS INDEX: 29.46 KG/M2 | RESPIRATION RATE: 16 BRPM

## 2022-05-11 DIAGNOSIS — M99.01 CERVICAL SOMATIC DYSFUNCTION: ICD-10-CM

## 2022-05-11 DIAGNOSIS — M99.04 SACRAL REGION SOMATIC DYSFUNCTION: ICD-10-CM

## 2022-05-11 DIAGNOSIS — M99.07 UPPER EXTREMITY SOMATIC DYSFUNCTION: ICD-10-CM

## 2022-05-11 DIAGNOSIS — M99.08 RIB CAGE REGION SOMATIC DYSFUNCTION: ICD-10-CM

## 2022-05-11 DIAGNOSIS — M51.37 DDD (DEGENERATIVE DISC DISEASE), LUMBOSACRAL: Primary | ICD-10-CM

## 2022-05-11 DIAGNOSIS — M99.05 PELVIC SOMATIC DYSFUNCTION: ICD-10-CM

## 2022-05-11 DIAGNOSIS — M47.816 LUMBAR SPONDYLOSIS: ICD-10-CM

## 2022-05-11 DIAGNOSIS — M99.09 SOMATIC DYSFUNCTION OF ABDOMINAL REGION: ICD-10-CM

## 2022-05-11 DIAGNOSIS — M99.06 LOWER LIMB REGION SOMATIC DYSFUNCTION: ICD-10-CM

## 2022-05-11 DIAGNOSIS — M99.02 THORACIC REGION SOMATIC DYSFUNCTION: ICD-10-CM

## 2022-05-11 DIAGNOSIS — M99.03 LUMBAR REGION SOMATIC DYSFUNCTION: ICD-10-CM

## 2022-05-11 PROCEDURE — 99213 OFFICE O/P EST LOW 20 MIN: CPT | Performed by: FAMILY MEDICINE

## 2022-05-11 PROCEDURE — 98929 OSTEOPATH MANJ 9-10 REGIONS: CPT | Performed by: FAMILY MEDICINE

## 2022-05-11 NOTE — PROGRESS NOTES
HISTORY OF PRESENT ILLNESS    Meño Lassiter 1985 is a 44y.o. year old male comes in today to be evaluated and treated for: back pain    Since last appt has noticed pain worsening the last 2 weeks upper and low back into back left leg when walking legs. Pain level 2/10. Using norco rare with benefit. IMAGING: XR thoracic 8/4/2021  1. No acute pathology appreciated in the thoracic spine. Past Surgical History:   Procedure Laterality Date    HX ACL RECONSTRUCTION      20 yrs ago cant remember what side per pt statement      Social History     Socioeconomic History    Marital status: SINGLE   Tobacco Use    Smoking status: Never Smoker    Smokeless tobacco: Never Used   Vaping Use    Vaping Use: Never used   Substance and Sexual Activity    Alcohol use: Yes     Alcohol/week: 1.0 standard drink     Types: 1 Cans of beer per week     Comment: occasionally    Drug use: No   Social History Narrative    ** Merged History Encounter **          Current Outpatient Medications   Medication Sig Dispense Refill    diazePAM (Valium) 5 mg tablet Take 0.5-1 Tablets by mouth every twelve (12) hours as needed for Muscle Spasm(s). Max Daily Amount: 10 mg. 30 Tablet 0    baclofen (LIORESAL) 10 mg tablet TAKE 0.5-1 TABS BY MOUTH TWO (2) TIMES DAILY AS NEEDED FOR MUSCLE SPASM(S) OR PAIN 60 Tablet 1    ibuprofen (MOTRIN) 800 mg tablet TAKE 1 TABLET BY MOUTH EVERY 8 HOURS AS NEEDED FOR PAIN 270 Tablet 0    omalizumab (XOLAIR) 150 mg solr 300 mg by SubCUTAneous route every thirty (30) days.        Past Medical History:   Diagnosis Date    Arthritis     Decreased urine stream     Dyslipidemia     Erythema multiforme     Heartburn     High cholesterol     Nocturia     Urinary frequency     Urinary urgency     Weak urinary stream      Family History   Problem Relation Age of Onset    No Known Problems Mother     No Known Problems Father     Other Brother        ROS:  No numb, tingle, incont, fever    Objective:  Pulse 71   Resp 16   Ht 6' 2\" (1.88 m)   Wt 229 lb 9.6 oz (104.1 kg)   SpO2 98%   BMI 29.48 kg/m²   NEURO:  Sensation intact to light touch. Reflexes +2/4 patellar and Achilles bilaterally. M/S:  Examined seated and supine. Slump negative. Standing flexion test positive left  Sphinx test positive left. ASIS low left  Iliac crests equal bilaterally Pubes equal bilaterally Medial malleolus low left  Sacral base posterior left  TRISH low left  TTA at C3, 6 on left worse flexion, T2,3, 5 on right worse flexion and L2, 4 on left worse flexion Rib(s) 3, 4 right TTP and posterior LE Strength +5/5 bilaterally Piriformis normal bilaterally. Thoracic diaphragm restricted left. Scapula motion restricted w/ TTA right. Hip flexion limited left. Assessment/Plan:     ICD-10-CM ICD-9-CM    1. DDD (degenerative disc disease), lumbosacral  M51.37 722.52    2. Lumbar spondylosis  M47.816 721.3 ID OSTEOPATHIC MANIP,9-10 BODY REGN   3. Lumbar region somatic dysfunction  M99.03 739.3 ID OSTEOPATHIC MANIP,9-10 BODY REGN   4. Pelvic somatic dysfunction  M99.05 739.5 ID OSTEOPATHIC MANIP,9-10 BODY REGN   5. Sacral region somatic dysfunction  M99.04 739.4 ID OSTEOPATHIC MANIP,9-10 BODY REGN   6. Thoracic region somatic dysfunction  M99.02 739.2 ID OSTEOPATHIC MANIP,9-10 BODY REGN   7. Rib cage region somatic dysfunction  M99.08 739.8 ID OSTEOPATHIC MANIP,9-10 BODY REGN   8. Cervical somatic dysfunction  M99.01 739.1 ID OSTEOPATHIC MANIP,9-10 BODY REGN   9. Upper extremity somatic dysfunction  M99.07 739.7 ID OSTEOPATHIC MANIP,9-10 BODY REGN   10. Lower limb region somatic dysfunction  M99.06 739.6 ID OSTEOPATHIC MANIP,9-10 BODY REGN   11. Somatic dysfunction of abdominal region  M99.09 739.9 [address]     Patient (or guardian if minor) verbalizes understanding of evaluation and plan. Verbal consent obtained.   Cervical, Thoracic, Rib, Lumbar, Pelvic, Sacral, Lower Ext, Upper Ext and Abdominal SD treated with myofascial and ME. Correction of previous malalignments verified after Tx. Pt tolerated well. Notes improvement of Sx and pain is now rated 0/10. HEP/stretches daily. Discussed stretching/strengthening/posture. Will start HEP as above and plan follow-up as needed. Provided HEP for plica as requested.

## 2024-10-22 ENCOUNTER — OFFICE VISIT (OUTPATIENT)
Age: 39
End: 2024-10-22
Payer: COMMERCIAL

## 2024-10-22 ENCOUNTER — HOSPITAL ENCOUNTER (OUTPATIENT)
Facility: HOSPITAL | Age: 39
Discharge: HOME | End: 2024-10-25

## 2024-10-22 VITALS — WEIGHT: 247 LBS | BODY MASS INDEX: 31.71 KG/M2

## 2024-10-22 DIAGNOSIS — M99.09 SOMATIC DYSFUNCTION OF ABDOMINAL REGION: ICD-10-CM

## 2024-10-22 DIAGNOSIS — M99.07 UPPER EXTREMITY SOMATIC DYSFUNCTION: ICD-10-CM

## 2024-10-22 DIAGNOSIS — M99.03 SOMATIC DYSFUNCTION OF LUMBAR REGION: ICD-10-CM

## 2024-10-22 DIAGNOSIS — M51.362 DEGENERATION OF INTERVERTEBRAL DISC OF LUMBAR REGION WITH DISCOGENIC BACK PAIN AND LOWER EXTREMITY PAIN: Primary | ICD-10-CM

## 2024-10-22 DIAGNOSIS — M51.34 DDD (DEGENERATIVE DISC DISEASE), THORACIC: ICD-10-CM

## 2024-10-22 DIAGNOSIS — M99.05 PELVIC REGION SOMATIC DYSFUNCTION: ICD-10-CM

## 2024-10-22 DIAGNOSIS — M99.06 LOWER LIMB REGION SOMATIC DYSFUNCTION: ICD-10-CM

## 2024-10-22 DIAGNOSIS — M99.01 CERVICAL SOMATIC DYSFUNCTION: ICD-10-CM

## 2024-10-22 DIAGNOSIS — M99.02 THORACIC REGION SOMATIC DYSFUNCTION: ICD-10-CM

## 2024-10-22 DIAGNOSIS — M51.362 DEGENERATION OF INTERVERTEBRAL DISC OF LUMBAR REGION WITH DISCOGENIC BACK PAIN AND LOWER EXTREMITY PAIN: ICD-10-CM

## 2024-10-22 DIAGNOSIS — M99.08 RIB CAGE REGION SOMATIC DYSFUNCTION: ICD-10-CM

## 2024-10-22 DIAGNOSIS — M99.04 SACRAL REGION SOMATIC DYSFUNCTION: ICD-10-CM

## 2024-10-22 PROCEDURE — 99214 OFFICE O/P EST MOD 30 MIN: CPT | Performed by: FAMILY MEDICINE

## 2024-10-22 PROCEDURE — 98929 OSTEOPATH MANJ 9-10 REGIONS: CPT | Performed by: FAMILY MEDICINE

## 2024-10-22 RX ORDER — IBUPROFEN 800 MG/1
800 TABLET, FILM COATED ORAL
Qty: 90 TABLET | Refills: 1 | Status: SHIPPED | OUTPATIENT
Start: 2024-10-22

## 2024-10-22 NOTE — PROGRESS NOTES
Assessment/Plan:    Suspect acute exacerbation of seasonal allergies as b/l ear canals and tympanic membrane within normal limits   -suggested trial of xyzal or allegra as patient has taken zyrtec for many years   -continue flonase  -avoid use of Q-tips  -f/u in 1 week or sooner if symptoms worsen or fail to improve       Diagnoses and all orders for this visit:    Left ear pain          Subjective:      Patient ID: Cl Holman is a 78 y o  female who presents to office today for evaluation of left ear pain  She reports she frequently has ear irritation from her seasonal allergies but recently was experiences left inner ear pain  Denies hearing loss  Denies drainage  Denies swimming pools  Denies water or foreign body into year  Uses Q-tips  Earache   There is pain in the left ear  This is a new problem  The current episode started in the past 7 days  There has been no fever  Associated symptoms include rhinorrhea (chronic w/ seasonal allergies)  Pertinent negatives include no coughing, diarrhea, ear discharge, headaches or sore throat  Treatments tried: cleaning out ears  chronic itchy ears       The following portions of the patient's history were reviewed and updated as appropriate: allergies, current medications, past family history, past medical history, past social history, past surgical history and problem list     Review of Systems   Constitutional: Negative for fatigue and fever  HENT: Positive for ear pain and rhinorrhea (chronic w/ seasonal allergies)  Negative for ear discharge and sore throat  Eyes: Negative for visual disturbance  Respiratory: Negative for cough  Cardiovascular: Negative for chest pain and palpitations  Gastrointestinal: Negative for diarrhea  Allergic/Immunologic: Positive for environmental allergies  Neurological: Negative for headaches  Psychiatric/Behavioral: The patient is not nervous/anxious  All other systems reviewed and are negative  HISTORY OF PRESENT ILLNESS    Vicente Maguire 1985 is a 39 y.o. year old male comes in today to be evaluated and treated for: back pain - chronic    Since last appt 5/11/2022 has noticed pain for years but flared up a few months ago after renovating house. Pain level 2/10. Doing cat/cow and some Ibuprofen 800mg with benefit. Some numbness right foot and pain right back to bottom right foot.    IMAGING: XR lumbar, thoracic pending    XR thoracic 8/4/2021  1. No acute pathology appreciated in the thoracic spine.     Past Surgical History:   Procedure Laterality Date    ANTERIOR CRUCIATE LIGAMENT REPAIR      20 yrs ago cant remember what side per pt statement      Social History     Socioeconomic History    Marital status: Single     Spouse name: None    Number of children: None    Years of education: None    Highest education level: None   Tobacco Use    Smoking status: Never    Smokeless tobacco: Never   Substance and Sexual Activity    Alcohol use: Yes     Alcohol/week: 1.0 standard drink of alcohol    Drug use: No   Social History Narrative    ** Merged History Encounter **          Current Outpatient Medications   Medication Sig Dispense Refill    ibuprofen (ADVIL;MOTRIN) 800 MG tablet TAKE 1 TABLET BY MOUTH EVERY 8 HOURS AS NEEDED FOR PAIN      omalizumab (XOLAIR) 150 MG injection Inject 300 mg into the skin every 30 days       No current facility-administered medications for this visit.     Past Medical History:   Diagnosis Date    Arthritis     Decreased urine stream     Dyslipidemia     Erythema multiforme     Heartburn     High cholesterol     Nocturia     Urinary frequency     Urinary urgency     Weak urinary stream      Family History   Problem Relation Age of Onset    No Known Problems Mother     Other Brother     No Known Problems Father      ROS:  No incont, fever. + numb right foot.    Objective:  Wt 112 kg (247 lb)   BMI 31.71 kg/m²   NEURO:  Sensation intact to light touch.  Reflexes +2/4  Objective:      /78   Pulse 89   Temp 97 8 °F (36 6 °C)   Resp 18   Ht 5' 3" (1 6 m)   Wt 116 kg (255 lb 3 2 oz)   LMP 01/01/2004   SpO2 98%   BMI 45 21 kg/m²          Physical Exam  Vitals reviewed  Constitutional:       General: She is not in acute distress  Appearance: She is not ill-appearing  HENT:      Head: Normocephalic and atraumatic  Right Ear: Tympanic membrane, ear canal and external ear normal  There is no impacted cerumen  Left Ear: Tympanic membrane, ear canal and external ear normal  There is no impacted cerumen  Ears:      Comments: No fluid seen b/l ears or b/l tympanic membrane  Tympanic membrane visualized and appears wnl   No wax in b/l ears  No drainage from canals   No pain upon manipulation of b/l pinna      Nose: Congestion present  Comments: Frontal sinus b/l non tender to palpation     Mouth/Throat:      Pharynx: Posterior oropharyngeal erythema (mild) present  Neck:      Comments: Submandibular lymphadenopathy non tender  +Goiter  Cardiovascular:      Rate and Rhythm: Normal rate and regular rhythm  Heart sounds: Murmur (systolic) heard  Pulmonary:      Effort: Pulmonary effort is normal  No respiratory distress  Skin:     General: Skin is warm  Coloration: Skin is not jaundiced or pale  Neurological:      General: No focal deficit present  Mental Status: She is alert and oriented to person, place, and time        Gait: Gait normal    Psychiatric:         Mood and Affect: Mood normal          Behavior: Behavior normal

## 2024-10-22 NOTE — PATIENT INSTRUCTIONS
Either scan this QR code on your smartphone:        Or search YouTube for my channel:    Dr. DESMOND Moran    Low back/Piriformis    Neck/Upper back

## 2024-12-19 ENCOUNTER — TELEPHONE (OUTPATIENT)
Age: 39
End: 2024-12-19

## 2024-12-19 NOTE — TELEPHONE ENCOUNTER
Patient called to ask if it would be possible to get the physical therapy referral that was discussed at his last visit. He states he refused at the time but feels it would be beneficial.    Please review and advise patient, [phone]

## 2024-12-20 NOTE — TELEPHONE ENCOUNTER
Contacted pt at his home number. Informed him that Dr Moran needs to see him in the office for an evaluation. Pt states that he will call back due to leaving on vacation for 2 weeks due to the holidays.

## 2025-07-15 ENCOUNTER — OFFICE VISIT (OUTPATIENT)
Age: 40
End: 2025-07-15
Payer: COMMERCIAL

## 2025-07-15 ENCOUNTER — HOSPITAL ENCOUNTER (OUTPATIENT)
Facility: HOSPITAL | Age: 40
Discharge: HOME | End: 2025-07-18

## 2025-07-15 VITALS — BODY MASS INDEX: 28.88 KG/M2 | WEIGHT: 225 LBS | RESPIRATION RATE: 14 BRPM | HEIGHT: 74 IN

## 2025-07-15 DIAGNOSIS — M77.12 LATERAL EPICONDYLITIS, LEFT ELBOW: Primary | ICD-10-CM

## 2025-07-15 DIAGNOSIS — M77.12 LATERAL EPICONDYLITIS, LEFT ELBOW: ICD-10-CM

## 2025-07-15 PROCEDURE — 99214 OFFICE O/P EST MOD 30 MIN: CPT | Performed by: FAMILY MEDICINE

## 2025-07-15 RX ORDER — BUPROPION HYDROCHLORIDE 150 MG/1
150 TABLET ORAL DAILY
COMMUNITY
Start: 2025-06-24

## 2025-07-15 RX ORDER — ROSUVASTATIN CALCIUM 10 MG/1
10 TABLET, COATED ORAL DAILY
COMMUNITY

## 2025-07-15 RX ORDER — ALPRAZOLAM 1 MG/1
1 TABLET ORAL DAILY
COMMUNITY
Start: 2025-06-29 | End: 2025-07-29

## 2025-07-15 NOTE — PROGRESS NOTES
HISTORY OF PRESENT ILLNESS    Vicente Maguire 1985 is a 40 y.o. year old male comes in today to be evaluated and treated for: left elbow pain    Since last appt 10/22/2024 has noticed pain In elbow about a month after doing overhead triceps presses and then 2 weeks later worked on deck then [employer] race made it miserable. Pain level 4/10. Using ice minimal benefit, Ibuprofen 800mg helps.    XR left elbow pending    Past Surgical History:   Procedure Laterality Date    ANTERIOR CRUCIATE LIGAMENT REPAIR      20 yrs ago cant remember what side per pt statement      Social History     Socioeconomic History    Marital status: Single     Spouse name: None    Number of children: None    Years of education: None    Highest education level: None   Tobacco Use    Smoking status: Never    Smokeless tobacco: Never   Substance and Sexual Activity    Alcohol use: Not Currently     Alcohol/week: 1.0 standard drink of alcohol    Drug use: Never    Sexual activity: Yes     Partners: Female   Social History Narrative    ** Merged History Encounter **          Current Outpatient Medications   Medication Sig Dispense Refill    ALPRAZolam (XANAX) 1 MG tablet Take 1 tablet by mouth daily.      buPROPion (WELLBUTRIN XL) 150 MG extended release tablet Take 1 tablet by mouth daily      rosuvastatin (CRESTOR) 10 MG tablet Take 1 tablet by mouth daily      ibuprofen (ADVIL;MOTRIN) 800 MG tablet Take 1 tablet by mouth 3 times daily (with meals) 90 tablet 1    omalizumab (XOLAIR) 150 MG injection Inject 300 mg into the skin every 30 days       No current facility-administered medications for this visit.     Past Medical History:   Diagnosis Date    Arthritis     Decreased urine stream     Dyslipidemia     Erythema multiforme     Heartburn     High cholesterol     Nocturia     Urinary frequency     Urinary urgency     Weak urinary stream      Family History   Problem Relation Age of Onset    No Known Problems Mother     Other Brother     No

## 2025-07-15 NOTE — PATIENT INSTRUCTIONS
Wrist flexor stretch    Extend your arm in front of you with your palm up.  Bend your wrist, pointing your hand toward the floor.  With your other hand, gently bend your wrist farther until you feel a mild to moderate stretch in your forearm.  Hold for at least 15 to 30 seconds. Repeat 2 to 4 times.  Wrist extensor stretch    Repeat steps 1 through 4 of the stretch above, but begin with your extended hand palm down.